# Patient Record
Sex: FEMALE | Race: BLACK OR AFRICAN AMERICAN | NOT HISPANIC OR LATINO | Employment: FULL TIME | RURAL
[De-identification: names, ages, dates, MRNs, and addresses within clinical notes are randomized per-mention and may not be internally consistent; named-entity substitution may affect disease eponyms.]

---

## 2018-10-31 ENCOUNTER — HISTORICAL (OUTPATIENT)
Dept: ADMINISTRATIVE | Facility: HOSPITAL | Age: 48
End: 2018-10-31

## 2018-10-31 LAB — CRC RECOMMENDATION EXT: NORMAL

## 2018-11-01 LAB
LAB AP CLINICAL INFORMATION: NORMAL
LAB AP DIAGNOSIS - HISTORICAL: NORMAL
LAB AP GROSS PATHOLOGY - HISTORICAL: NORMAL
LAB AP SPECIMEN SUBMITTED - HISTORICAL: NORMAL

## 2020-08-04 ENCOUNTER — HISTORICAL (OUTPATIENT)
Dept: ADMINISTRATIVE | Facility: HOSPITAL | Age: 50
End: 2020-08-04

## 2020-08-04 LAB — SARS-COV+SARS-COV-2 AG RESP QL IA.RAPID: NEGATIVE

## 2020-08-20 ENCOUNTER — HISTORICAL (OUTPATIENT)
Dept: ADMINISTRATIVE | Facility: HOSPITAL | Age: 50
End: 2020-08-20

## 2020-08-20 LAB — SARS-COV+SARS-COV-2 AG RESP QL IA.RAPID: NEGATIVE

## 2020-08-28 ENCOUNTER — HISTORICAL (OUTPATIENT)
Dept: ADMINISTRATIVE | Facility: HOSPITAL | Age: 50
End: 2020-08-28

## 2020-08-28 LAB — SARS-COV+SARS-COV-2 AG RESP QL IA.RAPID: NEGATIVE

## 2020-09-09 ENCOUNTER — HISTORICAL (OUTPATIENT)
Dept: ADMINISTRATIVE | Facility: HOSPITAL | Age: 50
End: 2020-09-09

## 2020-09-09 LAB — SARS-COV+SARS-COV-2 AG RESP QL IA.RAPID: NEGATIVE

## 2020-09-29 ENCOUNTER — HISTORICAL (OUTPATIENT)
Dept: ADMINISTRATIVE | Facility: HOSPITAL | Age: 50
End: 2020-09-29

## 2020-10-02 LAB
LAB AP CLINICAL INFORMATION: NORMAL
LAB AP GENERAL CAT - HISTORICAL: NORMAL
LAB AP INTERPRETATION/RESULT - HISTORICAL: NEGATIVE
LAB AP SPECIMEN ADEQUACY - HISTORICAL: NORMAL
LAB AP SPECIMEN SUBMITTED - HISTORICAL: NORMAL

## 2021-02-12 ENCOUNTER — HISTORICAL (OUTPATIENT)
Dept: ADMINISTRATIVE | Facility: HOSPITAL | Age: 51
End: 2021-02-12

## 2021-02-12 LAB — SARS-COV+SARS-COV-2 AG RESP QL IA.RAPID: NEGATIVE

## 2021-02-16 ENCOUNTER — HISTORICAL (OUTPATIENT)
Dept: ADMINISTRATIVE | Facility: HOSPITAL | Age: 51
End: 2021-02-16

## 2021-02-17 LAB — SARS-COV+SARS-COV-2 AG RESP QL IA.RAPID: NEGATIVE

## 2021-03-24 ENCOUNTER — OFFICE VISIT (OUTPATIENT)
Dept: FAMILY MEDICINE | Facility: CLINIC | Age: 51
End: 2021-03-24
Payer: COMMERCIAL

## 2021-03-24 VITALS
WEIGHT: 188 LBS | BODY MASS INDEX: 32.1 KG/M2 | HEIGHT: 64 IN | RESPIRATION RATE: 16 BRPM | SYSTOLIC BLOOD PRESSURE: 134 MMHG | OXYGEN SATURATION: 98 % | TEMPERATURE: 99 F | DIASTOLIC BLOOD PRESSURE: 72 MMHG | HEART RATE: 63 BPM

## 2021-03-24 DIAGNOSIS — Z00.00 WELLNESS EXAMINATION: ICD-10-CM

## 2021-03-24 DIAGNOSIS — E55.9 VITAMIN D DEFICIENCY: Primary | ICD-10-CM

## 2021-03-24 DIAGNOSIS — E53.8 B12 DEFICIENCY: ICD-10-CM

## 2021-03-24 PROCEDURE — 99214 PR OFFICE/OUTPT VISIT, EST, LEVL IV, 30-39 MIN: ICD-10-PCS | Mod: ,,, | Performed by: FAMILY MEDICINE

## 2021-03-24 PROCEDURE — 99214 OFFICE O/P EST MOD 30 MIN: CPT | Mod: ,,, | Performed by: FAMILY MEDICINE

## 2021-03-24 RX ORDER — CHOLECALCIFEROL (VITAMIN D3) 1250 MCG
1 TABLET ORAL
Qty: 12 TABLET | Refills: 0 | Status: SHIPPED | OUTPATIENT
Start: 2021-03-24 | End: 2021-06-23 | Stop reason: SDUPTHER

## 2021-03-24 RX ORDER — ERGOCALCIFEROL 1.25 MG/1
50000 CAPSULE ORAL
COMMUNITY
Start: 2021-02-12 | End: 2022-01-06 | Stop reason: SDUPTHER

## 2021-03-24 RX ORDER — LANOLIN ALCOHOL/MO/W.PET/CERES
100 CREAM (GRAM) TOPICAL DAILY
COMMUNITY
End: 2023-02-28 | Stop reason: SDUPTHER

## 2021-03-24 RX ORDER — FERROUS SULFATE 324(65)MG
324 TABLET, DELAYED RELEASE (ENTERIC COATED) ORAL 2 TIMES DAILY WITH MEALS
COMMUNITY
Start: 2021-02-24 | End: 2021-06-23 | Stop reason: SDUPTHER

## 2021-03-24 RX ORDER — APIXABAN 5 MG/1
5 TABLET, FILM COATED ORAL 2 TIMES DAILY
COMMUNITY
Start: 2021-03-22 | End: 2021-06-23 | Stop reason: SDUPTHER

## 2021-03-24 RX ORDER — FOLIC ACID 1 MG/1
1 TABLET ORAL DAILY
COMMUNITY
End: 2021-06-23 | Stop reason: SDUPTHER

## 2021-03-24 RX ORDER — ERGOCALCIFEROL 1.25 MG/1
50000 CAPSULE ORAL
Qty: 4 CAPSULE | Refills: 4 | Status: CANCELLED | OUTPATIENT
Start: 2021-03-24

## 2021-06-23 ENCOUNTER — OFFICE VISIT (OUTPATIENT)
Dept: FAMILY MEDICINE | Facility: CLINIC | Age: 51
End: 2021-06-23
Payer: COMMERCIAL

## 2021-06-23 VITALS
RESPIRATION RATE: 16 BRPM | HEIGHT: 64 IN | DIASTOLIC BLOOD PRESSURE: 74 MMHG | HEART RATE: 65 BPM | BODY MASS INDEX: 32.27 KG/M2 | OXYGEN SATURATION: 100 % | SYSTOLIC BLOOD PRESSURE: 142 MMHG | TEMPERATURE: 98 F | WEIGHT: 189 LBS

## 2021-06-23 DIAGNOSIS — E55.9 VITAMIN D DEFICIENCY: ICD-10-CM

## 2021-06-23 DIAGNOSIS — I82.593 CHRONIC DEEP VEIN THROMBOSIS (DVT) OF OTHER VEIN OF BOTH LOWER EXTREMITIES: Primary | ICD-10-CM

## 2021-06-23 PROCEDURE — 1126F PR PAIN SEVERITY QUANTIFIED, NO PAIN PRESENT: ICD-10-PCS | Mod: ,,, | Performed by: FAMILY MEDICINE

## 2021-06-23 PROCEDURE — 99214 PR OFFICE/OUTPT VISIT, EST, LEVL IV, 30-39 MIN: ICD-10-PCS | Mod: ,,, | Performed by: FAMILY MEDICINE

## 2021-06-23 PROCEDURE — 3008F PR BODY MASS INDEX (BMI) DOCUMENTED: ICD-10-PCS | Mod: ,,, | Performed by: FAMILY MEDICINE

## 2021-06-23 PROCEDURE — 1126F AMNT PAIN NOTED NONE PRSNT: CPT | Mod: ,,, | Performed by: FAMILY MEDICINE

## 2021-06-23 PROCEDURE — 99214 OFFICE O/P EST MOD 30 MIN: CPT | Mod: ,,, | Performed by: FAMILY MEDICINE

## 2021-06-23 PROCEDURE — 3008F BODY MASS INDEX DOCD: CPT | Mod: ,,, | Performed by: FAMILY MEDICINE

## 2021-06-23 RX ORDER — FOLIC ACID 1 MG/1
1 TABLET ORAL DAILY
Qty: 30 TABLET | Refills: 2 | Status: SHIPPED | OUTPATIENT
Start: 2021-06-23 | End: 2022-09-07 | Stop reason: SDUPTHER

## 2021-06-23 RX ORDER — APIXABAN 5 MG/1
5 TABLET, FILM COATED ORAL 2 TIMES DAILY
Qty: 120 TABLET | Refills: 0 | Status: SHIPPED | OUTPATIENT
Start: 2021-06-23 | End: 2022-01-06 | Stop reason: SDUPTHER

## 2021-06-23 RX ORDER — FERROUS SULFATE 324(65)MG
324 TABLET, DELAYED RELEASE (ENTERIC COATED) ORAL 2 TIMES DAILY WITH MEALS
Qty: 60 TABLET | Refills: 1 | Status: SHIPPED | OUTPATIENT
Start: 2021-06-23 | End: 2022-01-06 | Stop reason: SDUPTHER

## 2021-06-23 RX ORDER — CHOLECALCIFEROL (VITAMIN D3) 1250 MCG
1 TABLET ORAL
Qty: 12 TABLET | Refills: 0 | Status: SHIPPED | OUTPATIENT
Start: 2021-06-23 | End: 2021-09-21

## 2021-07-01 ENCOUNTER — PATIENT MESSAGE (OUTPATIENT)
Dept: ADMINISTRATIVE | Facility: OTHER | Age: 51
End: 2021-07-01

## 2021-08-19 ENCOUNTER — OFFICE VISIT (OUTPATIENT)
Dept: FAMILY MEDICINE | Facility: CLINIC | Age: 51
End: 2021-08-19
Payer: COMMERCIAL

## 2021-08-19 VITALS
DIASTOLIC BLOOD PRESSURE: 73 MMHG | HEIGHT: 64 IN | RESPIRATION RATE: 20 BRPM | WEIGHT: 185 LBS | OXYGEN SATURATION: 99 % | HEART RATE: 74 BPM | SYSTOLIC BLOOD PRESSURE: 143 MMHG | BODY MASS INDEX: 31.58 KG/M2

## 2021-08-19 DIAGNOSIS — Z23 ENCOUNTER FOR IMMUNIZATION: ICD-10-CM

## 2021-08-19 DIAGNOSIS — Z00.00 ROUTINE GENERAL MEDICAL EXAMINATION AT A HEALTH CARE FACILITY: Primary | ICD-10-CM

## 2021-08-19 DIAGNOSIS — Z11.59 ENCOUNTER FOR HEPATITIS C SCREENING TEST FOR LOW RISK PATIENT: ICD-10-CM

## 2021-08-19 DIAGNOSIS — Z11.4 ENCOUNTER FOR SCREENING FOR HUMAN IMMUNODEFICIENCY VIRUS (HIV): ICD-10-CM

## 2021-08-19 LAB
HCV AB SER QL: NORMAL
HIV 1+O+2 AB SERPL QL: NORMAL

## 2021-08-19 PROCEDURE — 3077F SYST BP >= 140 MM HG: CPT | Mod: ICN,,, | Performed by: INTERNAL MEDICINE

## 2021-08-19 PROCEDURE — 1160F RVW MEDS BY RX/DR IN RCRD: CPT | Mod: ICN,,, | Performed by: INTERNAL MEDICINE

## 2021-08-19 PROCEDURE — 90471 IMMUNIZATION ADMIN: CPT | Mod: ICN,,, | Performed by: INTERNAL MEDICINE

## 2021-08-19 PROCEDURE — 99386 PREV VISIT NEW AGE 40-64: CPT | Mod: 25,ICN,, | Performed by: INTERNAL MEDICINE

## 2021-08-19 PROCEDURE — 3008F BODY MASS INDEX DOCD: CPT | Mod: ICN,,, | Performed by: INTERNAL MEDICINE

## 2021-08-19 PROCEDURE — 90750 ZOSTER RECOMBINANT VACCINE: ICD-10-PCS | Mod: ICN,,, | Performed by: INTERNAL MEDICINE

## 2021-08-19 PROCEDURE — 1160F PR REVIEW ALL MEDS BY PRESCRIBER/CLIN PHARMACIST DOCUMENTED: ICD-10-PCS | Mod: ICN,,, | Performed by: INTERNAL MEDICINE

## 2021-08-19 PROCEDURE — 90471 ZOSTER RECOMBINANT VACCINE: ICD-10-PCS | Mod: ICN,,, | Performed by: INTERNAL MEDICINE

## 2021-08-19 PROCEDURE — 90750 HZV VACC RECOMBINANT IM: CPT | Mod: ICN,,, | Performed by: INTERNAL MEDICINE

## 2021-08-19 PROCEDURE — 86803 HEPATITIS C AB TEST: CPT | Mod: ,,, | Performed by: CLINICAL MEDICAL LABORATORY

## 2021-08-19 PROCEDURE — 86803 HEPATITIS C ANTIBODY: ICD-10-PCS | Mod: ,,, | Performed by: CLINICAL MEDICAL LABORATORY

## 2021-08-19 PROCEDURE — 3008F PR BODY MASS INDEX (BMI) DOCUMENTED: ICD-10-PCS | Mod: ICN,,, | Performed by: INTERNAL MEDICINE

## 2021-08-19 PROCEDURE — 3078F DIAST BP <80 MM HG: CPT | Mod: ICN,,, | Performed by: INTERNAL MEDICINE

## 2021-08-19 PROCEDURE — 1159F PR MEDICATION LIST DOCUMENTED IN MEDICAL RECORD: ICD-10-PCS | Mod: ICN,,, | Performed by: INTERNAL MEDICINE

## 2021-08-19 PROCEDURE — 1159F MED LIST DOCD IN RCRD: CPT | Mod: ICN,,, | Performed by: INTERNAL MEDICINE

## 2021-08-19 PROCEDURE — 3078F PR MOST RECENT DIASTOLIC BLOOD PRESSURE < 80 MM HG: ICD-10-PCS | Mod: ICN,,, | Performed by: INTERNAL MEDICINE

## 2021-08-19 PROCEDURE — 87389 HIV 1 / 2 ANTIBODY: ICD-10-PCS | Mod: ,,, | Performed by: CLINICAL MEDICAL LABORATORY

## 2021-08-19 PROCEDURE — 99386 PR PREVENTIVE VISIT,NEW,40-64: ICD-10-PCS | Mod: 25,ICN,, | Performed by: INTERNAL MEDICINE

## 2021-08-19 PROCEDURE — 87389 HIV-1 AG W/HIV-1&-2 AB AG IA: CPT | Mod: ,,, | Performed by: CLINICAL MEDICAL LABORATORY

## 2021-08-19 PROCEDURE — 3077F PR MOST RECENT SYSTOLIC BLOOD PRESSURE >= 140 MM HG: ICD-10-PCS | Mod: ICN,,, | Performed by: INTERNAL MEDICINE

## 2021-09-01 DIAGNOSIS — Z13.9 ENCOUNTER FOR SCREENING: Primary | ICD-10-CM

## 2022-01-06 ENCOUNTER — OFFICE VISIT (OUTPATIENT)
Dept: FAMILY MEDICINE | Facility: CLINIC | Age: 52
End: 2022-01-06
Payer: COMMERCIAL

## 2022-01-06 VITALS
HEART RATE: 92 BPM | OXYGEN SATURATION: 99 % | SYSTOLIC BLOOD PRESSURE: 150 MMHG | WEIGHT: 178 LBS | HEIGHT: 64 IN | RESPIRATION RATE: 16 BRPM | BODY MASS INDEX: 30.39 KG/M2 | DIASTOLIC BLOOD PRESSURE: 89 MMHG

## 2022-01-06 DIAGNOSIS — E55.9 VITAMIN D DEFICIENCY: ICD-10-CM

## 2022-01-06 DIAGNOSIS — I82.593 CHRONIC DEEP VEIN THROMBOSIS (DVT) OF OTHER VEIN OF BOTH LOWER EXTREMITIES: ICD-10-CM

## 2022-01-06 DIAGNOSIS — M19.90 OSTEOARTHRITIS, UNSPECIFIED OSTEOARTHRITIS TYPE, UNSPECIFIED SITE: Primary | ICD-10-CM

## 2022-01-06 LAB — 25(OH)D3 SERPL-MCNC: 54.4 NG/ML

## 2022-01-06 PROCEDURE — 3008F BODY MASS INDEX DOCD: CPT | Mod: ,,, | Performed by: INTERNAL MEDICINE

## 2022-01-06 PROCEDURE — 1160F RVW MEDS BY RX/DR IN RCRD: CPT | Mod: ,,, | Performed by: INTERNAL MEDICINE

## 2022-01-06 PROCEDURE — 1159F PR MEDICATION LIST DOCUMENTED IN MEDICAL RECORD: ICD-10-PCS | Mod: ,,, | Performed by: INTERNAL MEDICINE

## 2022-01-06 PROCEDURE — 99214 OFFICE O/P EST MOD 30 MIN: CPT | Mod: ,,, | Performed by: INTERNAL MEDICINE

## 2022-01-06 PROCEDURE — 82306 VITAMIN D: ICD-10-PCS | Mod: ,,, | Performed by: CLINICAL MEDICAL LABORATORY

## 2022-01-06 PROCEDURE — 82306 VITAMIN D 25 HYDROXY: CPT | Mod: ,,, | Performed by: CLINICAL MEDICAL LABORATORY

## 2022-01-06 PROCEDURE — 99214 PR OFFICE/OUTPT VISIT, EST, LEVL IV, 30-39 MIN: ICD-10-PCS | Mod: ,,, | Performed by: INTERNAL MEDICINE

## 2022-01-06 PROCEDURE — 3008F PR BODY MASS INDEX (BMI) DOCUMENTED: ICD-10-PCS | Mod: ,,, | Performed by: INTERNAL MEDICINE

## 2022-01-06 PROCEDURE — 3079F PR MOST RECENT DIASTOLIC BLOOD PRESSURE 80-89 MM HG: ICD-10-PCS | Mod: ,,, | Performed by: INTERNAL MEDICINE

## 2022-01-06 PROCEDURE — 1160F PR REVIEW ALL MEDS BY PRESCRIBER/CLIN PHARMACIST DOCUMENTED: ICD-10-PCS | Mod: ,,, | Performed by: INTERNAL MEDICINE

## 2022-01-06 PROCEDURE — 3077F SYST BP >= 140 MM HG: CPT | Mod: ,,, | Performed by: INTERNAL MEDICINE

## 2022-01-06 PROCEDURE — 3079F DIAST BP 80-89 MM HG: CPT | Mod: ,,, | Performed by: INTERNAL MEDICINE

## 2022-01-06 PROCEDURE — 3077F PR MOST RECENT SYSTOLIC BLOOD PRESSURE >= 140 MM HG: ICD-10-PCS | Mod: ,,, | Performed by: INTERNAL MEDICINE

## 2022-01-06 PROCEDURE — 1159F MED LIST DOCD IN RCRD: CPT | Mod: ,,, | Performed by: INTERNAL MEDICINE

## 2022-01-06 RX ORDER — ERGOCALCIFEROL 1.25 MG/1
50000 CAPSULE ORAL
Qty: 12 CAPSULE | Refills: 0 | Status: SHIPPED | OUTPATIENT
Start: 2022-01-06 | End: 2024-01-03

## 2022-01-06 RX ORDER — APIXABAN 5 MG/1
5 TABLET, FILM COATED ORAL 2 TIMES DAILY
Qty: 120 TABLET | Refills: 0 | Status: SHIPPED | OUTPATIENT
Start: 2022-01-06 | End: 2022-02-23

## 2022-01-06 RX ORDER — FERROUS SULFATE 324(65)MG
324 TABLET, DELAYED RELEASE (ENTERIC COATED) ORAL 2 TIMES DAILY WITH MEALS
Qty: 60 TABLET | Refills: 1 | Status: SHIPPED | OUTPATIENT
Start: 2022-01-06 | End: 2022-09-07 | Stop reason: SDUPTHER

## 2022-01-06 NOTE — LETTER
January 6, 2022      [facility]  [address] [facility] [place] [address]  Phone: [phone]  Fax: [phone]       Patient: Karli Singh   YOB: 1970  Date of Visit: 01/06/2022    To Whom It May Concern:    KINZA Singh  was at [facility] on 01/06/2022. The patient may return to work on 01/10/2022 with no restrictions. If you have any questions or concerns, or if I can be of further assistance, please do not hesitate to contact me.    Sincerely,  MD Mary Jo Slater RN

## 2022-01-18 ENCOUNTER — TELEPHONE (OUTPATIENT)
Dept: FAMILY MEDICINE | Facility: CLINIC | Age: 52
End: 2022-01-18
Payer: COMMERCIAL

## 2022-01-18 NOTE — TELEPHONE ENCOUNTER
Call returned to pt to find out what meds were not sent to pharmacy. We have on file where 3 prescriptions were sent to her pharmacy electronically on 1/6/22.    No answer. Left message

## 2022-01-18 NOTE — TELEPHONE ENCOUNTER
----- Message from Lelo Wheat sent at 1/17/2022 12:26 PM CST -----  Meds have not been called into [facility] kasandra [id number]. Was seen last week

## 2022-01-26 NOTE — PROGRESS NOTES
Patient seen evaluated on 01/06/2022 patient is awake alert patient is in no acute distress patient does complain of swelling in both lower extremities.  Patient also complains of diffuse arthritis.  Vital signs reviewed and are stable  Lungs are clear no crackles or wheezing cardiovascular S1-S2 regular rate rhythm abdomen is soft positive bowel sounds obese nontender extremities not edematous neuro is no focal neurological deficits    Vitamin-D level is low    Problem 1. 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 b.i.d. replace the vitamin-D, and and continue anticoagulation.

## 2022-02-23 ENCOUNTER — HOSPITAL ENCOUNTER (OUTPATIENT)
Dept: RADIOLOGY | Facility: HOSPITAL | Age: 52
Discharge: HOME | End: 2022-02-23
Payer: COMMERCIAL

## 2022-02-23 ENCOUNTER — OFFICE VISIT (OUTPATIENT)
Dept: FAMILY MEDICINE | Facility: CLINIC | Age: 52
End: 2022-02-23
Payer: COMMERCIAL

## 2022-02-23 VITALS
OXYGEN SATURATION: 96 % | TEMPERATURE: 98 F | HEIGHT: 64 IN | SYSTOLIC BLOOD PRESSURE: 147 MMHG | HEART RATE: 82 BPM | DIASTOLIC BLOOD PRESSURE: 84 MMHG | RESPIRATION RATE: 18 BRPM | BODY MASS INDEX: 30.39 KG/M2 | WEIGHT: 178 LBS

## 2022-02-23 DIAGNOSIS — Z13.220 SCREENING FOR LIPID DISORDERS: ICD-10-CM

## 2022-02-23 DIAGNOSIS — Z00.01 ENCOUNTER FOR ANNUAL GENERAL MEDICAL EXAMINATION WITH ABNORMAL FINDINGS IN ADULT: Primary | ICD-10-CM

## 2022-02-23 DIAGNOSIS — Z79.899 ENCOUNTER FOR LONG-TERM (CURRENT) DRUG USE: ICD-10-CM

## 2022-02-23 DIAGNOSIS — Z13.1 SCREENING FOR DIABETES MELLITUS: ICD-10-CM

## 2022-02-23 DIAGNOSIS — I82.502 CHRONIC DEEP VEIN THROMBOSIS (DVT) OF LEFT LOWER EXTREMITY, UNSPECIFIED VEIN: ICD-10-CM

## 2022-02-23 DIAGNOSIS — Z12.39 SCREENING BREAST EXAMINATION: ICD-10-CM

## 2022-02-23 PROBLEM — Z00.00 ENCOUNTER FOR GENERAL ADULT MEDICAL EXAMINATION W/O ABNORMAL FINDINGS: Status: ACTIVE | Noted: 2022-02-23

## 2022-02-23 LAB
CHOLEST SERPL-MCNC: 184 MG/DL (ref 0–200)
CHOLEST/HDLC SERPL: 2.5 {RATIO}
CTP QC/QA: YES
EST. AVERAGE GLUCOSE BLD GHB EST-MCNC: 90 MG/DL
GLUCOSE SERPL-MCNC: 87 MG/DL (ref 74–106)
HBA1C MFR BLD HPLC: 5.3 % (ref 4.5–6.6)
HDLC SERPL-MCNC: 75 MG/DL (ref 40–60)
INR POC: 1 (ref 0–3.3)
LDLC SERPL CALC-MCNC: 99 MG/DL
LDLC/HDLC SERPL: 1.3 {RATIO}
NONHDLC SERPL-MCNC: 109 MG/DL
PT, POC: 12.4 (ref 12–14.7)
TRIGL SERPL-MCNC: 50 MG/DL (ref 35–150)
VLDLC SERPL-MCNC: 10 MG/DL

## 2022-02-23 PROCEDURE — 1160F RVW MEDS BY RX/DR IN RCRD: CPT | Mod: ,,, | Performed by: NURSE PRACTITIONER

## 2022-02-23 PROCEDURE — 77067 SCR MAMMO BI INCL CAD: CPT | Mod: TC

## 2022-02-23 PROCEDURE — 3077F SYST BP >= 140 MM HG: CPT | Mod: ,,, | Performed by: NURSE PRACTITIONER

## 2022-02-23 PROCEDURE — 80061 LIPID PANEL: CPT | Mod: ,,, | Performed by: CLINICAL MEDICAL LABORATORY

## 2022-02-23 PROCEDURE — 82947 ASSAY GLUCOSE BLOOD QUANT: CPT | Mod: ,,, | Performed by: CLINICAL MEDICAL LABORATORY

## 2022-02-23 PROCEDURE — 3008F PR BODY MASS INDEX (BMI) DOCUMENTED: ICD-10-PCS | Mod: ,,, | Performed by: NURSE PRACTITIONER

## 2022-02-23 PROCEDURE — 83036 HEMOGLOBIN GLYCOSYLATED A1C: CPT | Mod: ,,, | Performed by: CLINICAL MEDICAL LABORATORY

## 2022-02-23 PROCEDURE — 3079F DIAST BP 80-89 MM HG: CPT | Mod: ,,, | Performed by: NURSE PRACTITIONER

## 2022-02-23 PROCEDURE — 3077F PR MOST RECENT SYSTOLIC BLOOD PRESSURE >= 140 MM HG: ICD-10-PCS | Mod: ,,, | Performed by: NURSE PRACTITIONER

## 2022-02-23 PROCEDURE — 3079F PR MOST RECENT DIASTOLIC BLOOD PRESSURE 80-89 MM HG: ICD-10-PCS | Mod: ,,, | Performed by: NURSE PRACTITIONER

## 2022-02-23 PROCEDURE — 80061 LIPID PANEL: ICD-10-PCS | Mod: ,,, | Performed by: CLINICAL MEDICAL LABORATORY

## 2022-02-23 PROCEDURE — 83036 HEMOGLOBIN A1C: ICD-10-PCS | Mod: ,,, | Performed by: CLINICAL MEDICAL LABORATORY

## 2022-02-23 PROCEDURE — 3008F BODY MASS INDEX DOCD: CPT | Mod: ,,, | Performed by: NURSE PRACTITIONER

## 2022-02-23 PROCEDURE — 1159F PR MEDICATION LIST DOCUMENTED IN MEDICAL RECORD: ICD-10-PCS | Mod: ,,, | Performed by: NURSE PRACTITIONER

## 2022-02-23 PROCEDURE — 99396 PREV VISIT EST AGE 40-64: CPT | Mod: ,,, | Performed by: NURSE PRACTITIONER

## 2022-02-23 PROCEDURE — 85610 POCT PT/INR: ICD-10-PCS | Mod: ,,, | Performed by: NURSE PRACTITIONER

## 2022-02-23 PROCEDURE — 82947 GLUCOSE, FASTING: ICD-10-PCS | Mod: ,,, | Performed by: CLINICAL MEDICAL LABORATORY

## 2022-02-23 PROCEDURE — 1159F MED LIST DOCD IN RCRD: CPT | Mod: ,,, | Performed by: NURSE PRACTITIONER

## 2022-02-23 PROCEDURE — 1160F PR REVIEW ALL MEDS BY PRESCRIBER/CLIN PHARMACIST DOCUMENTED: ICD-10-PCS | Mod: ,,, | Performed by: NURSE PRACTITIONER

## 2022-02-23 PROCEDURE — 85610 PROTHROMBIN TIME: CPT | Mod: ,,, | Performed by: NURSE PRACTITIONER

## 2022-02-23 PROCEDURE — 99396 PR PREVENTIVE VISIT,EST,40-64: ICD-10-PCS | Mod: ,,, | Performed by: NURSE PRACTITIONER

## 2022-02-23 RX ORDER — WARFARIN SODIUM 5 MG/1
5 TABLET ORAL DAILY
Qty: 30 TABLET | Refills: 3 | Status: SHIPPED | OUTPATIENT
Start: 2022-02-23 | End: 2022-09-07 | Stop reason: SDUPTHER

## 2022-02-23 NOTE — PROGRESS NOTES
[facility]  Chief Complaint      Chief Complaint   Patient presents with    Annual Exam     Healthy you       History of Present Illness      Karli Singh is a 51 y.o. female with chronic conditions of Deep Venous Thrombus of left lower extremity, Hx of Protein S Deficiency Disease, and Anemia. She presents today for a [facility] Wellness Exam. She states she has been doing well and offers no complaints, however, the pt request change in her anticoagulant medication due to Eliquis rx no longer covered by her insurance. The pt is currently taking Eliquis 5 mg po BID for management of chronic dvt to left lower extremity.  She admits to taking Xarelto and Coumadin rxs in the past, states she does not want to take Xarelto due to it causing cramping pain and spasms to her hands.     Past Medical History:  Past Medical History:   Diagnosis Date    Anemia        Past Surgical History:   has no past surgical history on file.    Social History:  Social History     Tobacco Use    Smoking status: Never Smoker    Smokeless tobacco: Never Used   Substance Use Topics    Alcohol use: Never    Drug use: Never          Review of Systems   Constitutional: Negative for appetite change, fatigue, fever and unexpected weight change.   Respiratory: Negative for cough, shortness of breath and wheezing.    Cardiovascular: Negative for chest pain and leg swelling.        Hx of Chronic DVT to left lower extremity   Gastrointestinal: Negative for abdominal pain, constipation, diarrhea, nausea and vomiting.   Genitourinary: Negative for decreased urine volume, difficulty urinating, dysuria and flank pain.   Musculoskeletal: Negative for arthralgias and myalgias.   Skin: Negative for color change and rash.   Neurological: Negative for dizziness, syncope, weakness and headaches.        Medications:  Outpatient Encounter Medications as of 2/23/2022   Medication Sig Dispense Refill    cyanocobalamin (VITAMIN  "B-12) 1000 MCG tablet Take 100 mcg by mouth once daily.      ergocalciferol (ERGOCALCIFEROL) 50,000 unit Cap Take 1 capsule (50,000 Units total) by mouth every 7 days. 12 capsule 0    ferrous sulfate 324 mg (65 mg iron) TbEC Take 1 tablet (324 mg total) by mouth 2 (two) times daily with meals. 60 tablet 1    folic acid (FOLVITE) 1 MG tablet Take 1 tablet (1 mg total) by mouth once daily. 30 tablet 2    [DISCONTINUED] ELIQUIS 5 mg Tab Take 1 tablet (5 mg total) by mouth 2 (two) times daily. 120 tablet 0    warfarin (COUMADIN) 5 MG tablet Take 1 tablet (5 mg total) by mouth Daily. At hour of sleep 30 tablet 3     No facility-administered encounter medications on file as of 2/23/2022.       Allergies:  Review of patient's allergies indicates:   Allergen Reactions    Naprosyn [naproxen]        Health Maintenance:  Immunization History   Administered Date(s) Administered    Zoster Recombinant 08/19/2021      Health Maintenance   Topic Date Due    TETANUS VACCINE  08/19/2022 (Originally 10/3/1986)    Mammogram  02/23/2023    Lipid Panel  03/25/2026    Hepatitis C Screening  Completed        Physical Exam      Vital Signs  Temp: 97.6 °F (36.4 °C)  Temp src: Oral  Pulse: 82  Resp: 18  SpO2: 96 %  BP: (!) 147/84  BP Location: Right arm  Patient Position: Sitting  Height and Weight  Height: 5' 4" (162.6 cm)  Weight: 80.7 kg (178 lb)  BSA (Calculated - sq m): 1.91 sq meters  BMI (Calculated): 30.5  Weight in (lb) to have BMI = 25: 145.3]    Physical Exam  Constitutional:       General: She is not in acute distress.     Appearance: Normal appearance. She is obese.   HENT:      Head: Normocephalic.      Mouth/Throat:      Mouth: Mucous membranes are moist.   Cardiovascular:      Rate and Rhythm: Normal rate and regular rhythm.      Pulses: Normal pulses.      Heart sounds: Normal heart sounds. No murmur heard.  Musculoskeletal:         General: Normal range of motion.      Cervical back: Neck supple.   Skin:     " General: Skin is warm and dry.   Neurological:      Mental Status: She is alert and oriented to person, place, and time.   Psychiatric:         Mood and Affect: Mood normal.         Behavior: Behavior normal.          Laboratory:  CBC:  Recent Labs   Lab 03/25/21 0824   WBC 6.47   RBC 5.44 H   Hemoglobin 9.7 L   Hematocrit 33.2 L   Platelet Count 217   MCV 61.0 L   MCH 17.8 L   MCHC 29.2 L     CMP:  Recent Labs   Lab 03/25/21 0824   Glucose 80   Calcium 8.5   Albumin 3.5   Total Protein 7.7   Sodium 144   Potassium 4.2   CO2 32   Chloride 109 H   BUN 7   Alk Phos 105   ALT 14   AST 16   Bilirubin, Total 0.4     LIPIDS:  Recent Labs   Lab 03/25/21 0824   TSH 2.530   HDL Cholesterol 69   Cholesterol 181   Triglycerides 110   LDL Calculated 90   Non-     TSH:  Recent Labs   Lab 03/25/21 0824   TSH 2.530     A1C:        Assessment/Plan     Karli Singh is a 51 y.o.female with:     1. Screening for lipid disorders  - Lipid Panel; Future  - Lipid Panel    2. Screening for diabetes mellitus  - Glucose, Fasting; Future  - Hemoglobin A1C; Future  - Glucose, Fasting  - Hemoglobin A1C    3. Encounter for long-term (current) drug use  - POCT PT/INR    4. Chronic deep vein thrombosis (DVT) of left lower extremity, unspecified vein  - warfarin (COUMADIN) 5 MG tablet; Take 1 tablet (5 mg total) by mouth Daily. At hour of sleep  Dispense: 30 tablet; Refill: 3    5. Encounter for annual general medical examination with abnormal findings in adult     PT/INR today 1.0. Collaborated with Dr. Bar and will discontinue Eliquis rx. Start Coumadin 5 mg po at HS. Pt to return to clinic in 1 week for PT/INR and  follow-up visit with Dr. Bar.      Health goals to improve overall health and well-being:  Lower blood pressure by reducing calorie intake, engaging in physical activity, and eating heart healthy low sodium meals.   Biometrics: Monitor blood pressure at home with goal of systolic pressure < 130 and diastolic  pressure < 80.    Lifestyle: Get mammogram and pap smear done this year.  Keep all follow-up medical appointments. Take all medications as prescribed.    Nutrition: Eat a heart healthy high fiber diet including increased vegetables, fruit, lean meats and whole grains. Limit salt, sugar, and processed foods from diet.    Exercise: Engage in physical activity 30 minutes 3-5 days a week.     Tobacco: Avoid all tobacco products including second and  smoke exposure.      Return to clinic for follow-up on chronic medical conditions with Dr. Bar. Return to clinic in 1 year for annual wellness exam.    MONET [facility]

## 2022-05-30 PROBLEM — Z00.01 ENCOUNTER FOR ANNUAL GENERAL MEDICAL EXAMINATION WITH ABNORMAL FINDINGS IN ADULT: Status: RESOLVED | Noted: 2022-02-23 | Resolved: 2022-05-30

## 2022-07-22 ENCOUNTER — PATIENT OUTREACH (OUTPATIENT)
Dept: FAMILY MEDICINE | Facility: CLINIC | Age: 52
End: 2022-07-22
Payer: COMMERCIAL

## 2022-09-07 ENCOUNTER — OFFICE VISIT (OUTPATIENT)
Dept: FAMILY MEDICINE | Facility: CLINIC | Age: 52
End: 2022-09-07
Payer: COMMERCIAL

## 2022-09-07 VITALS
WEIGHT: 175.38 LBS | OXYGEN SATURATION: 95 % | DIASTOLIC BLOOD PRESSURE: 80 MMHG | RESPIRATION RATE: 18 BRPM | HEART RATE: 83 BPM | SYSTOLIC BLOOD PRESSURE: 144 MMHG | HEIGHT: 64 IN | TEMPERATURE: 98 F | BODY MASS INDEX: 29.94 KG/M2

## 2022-09-07 DIAGNOSIS — D64.9 ANEMIA, UNSPECIFIED TYPE: ICD-10-CM

## 2022-09-07 DIAGNOSIS — I82.502 CHRONIC DEEP VEIN THROMBOSIS (DVT) OF LEFT LOWER EXTREMITY, UNSPECIFIED VEIN: ICD-10-CM

## 2022-09-07 DIAGNOSIS — D68.59 PROTEIN S DEFICIENCY: ICD-10-CM

## 2022-09-07 DIAGNOSIS — D56.9 THALASSEMIA, UNSPECIFIED TYPE: ICD-10-CM

## 2022-09-07 DIAGNOSIS — Z20.822 CONTACT WITH AND (SUSPECTED) EXPOSURE TO COVID-19: Primary | ICD-10-CM

## 2022-09-07 DIAGNOSIS — I82.593 CHRONIC DEEP VEIN THROMBOSIS (DVT) OF OTHER VEIN OF BOTH LOWER EXTREMITIES: ICD-10-CM

## 2022-09-07 DIAGNOSIS — E55.9 VITAMIN D DEFICIENCY: ICD-10-CM

## 2022-09-07 LAB
ANION GAP SERPL CALCULATED.3IONS-SCNC: 11 MMOL/L (ref 7–16)
ANISOCYTOSIS BLD QL SMEAR: ABNORMAL
BASO STIPL BLD QL SMEAR: ABNORMAL
BASOPHILS # BLD AUTO: 0.04 K/UL (ref 0–0.2)
BASOPHILS NFR BLD AUTO: 0.7 % (ref 0–1)
BUN SERPL-MCNC: 8 MG/DL (ref 7–18)
BUN/CREAT SERPL: 9 (ref 6–20)
CALCIUM SERPL-MCNC: 9 MG/DL (ref 8.5–10.1)
CHLORIDE SERPL-SCNC: 103 MMOL/L (ref 98–107)
CO2 SERPL-SCNC: 28 MMOL/L (ref 21–32)
CREAT SERPL-MCNC: 0.92 MG/DL (ref 0.55–1.02)
CTP QC/QA: YES
DIFFERENTIAL METHOD BLD: ABNORMAL
EGFR (NO RACE VARIABLE) (RUSH/TITUS): 75 ML/MIN/1.73M²
EOSINOPHIL # BLD AUTO: 0.02 K/UL (ref 0–0.5)
EOSINOPHIL NFR BLD AUTO: 0.4 % (ref 1–4)
ERYTHROCYTE [DISTWIDTH] IN BLOOD BY AUTOMATED COUNT: 18.7 % (ref 11.5–14.5)
FERRITIN SERPL-MCNC: 681 NG/ML (ref 8–252)
FLUAV AG NPH QL: NEGATIVE
FLUBV AG NPH QL: NEGATIVE
FOLATE SERPL-MCNC: 12.8 NG/ML (ref 3.1–17.5)
GLUCOSE SERPL-MCNC: 81 MG/DL (ref 74–106)
HCT VFR BLD AUTO: 34.1 % (ref 38–47)
HGB BLD-MCNC: 10.3 G/DL (ref 12–16)
HYPOCHROMIA BLD QL SMEAR: ABNORMAL
IMM GRANULOCYTES # BLD AUTO: 0.02 K/UL (ref 0–0.04)
IMM GRANULOCYTES NFR BLD: 0.4 % (ref 0–0.4)
IMM RETICS NFR: 23.1 % (ref 3–15.9)
INR POC: 1.4 (ref 0–3.3)
IRON SATN MFR SERPL: 27 % (ref 14–50)
IRON SERPL-MCNC: 51 ΜG/DL (ref 50–170)
LYMPHOCYTES # BLD AUTO: 1.85 K/UL (ref 1–4.8)
LYMPHOCYTES NFR BLD AUTO: 34.2 % (ref 27–41)
MCH RBC QN AUTO: 18.4 PG (ref 27–31)
MCHC RBC AUTO-ENTMCNC: 30.2 G/DL (ref 32–36)
MCV RBC AUTO: 61 FL (ref 80–96)
MICROCYTES BLD QL SMEAR: ABNORMAL
MONOCYTES # BLD AUTO: 0.52 K/UL (ref 0–0.8)
MONOCYTES NFR BLD AUTO: 9.6 % (ref 2–6)
MPC BLD CALC-MCNC: ABNORMAL G/DL
NEUTROPHILS # BLD AUTO: 2.96 K/UL (ref 1.8–7.7)
NEUTROPHILS NFR BLD AUTO: 54.7 % (ref 53–65)
NRBC # BLD AUTO: 0.03 X10E3/UL
NRBC, AUTO (.00): 0.6 %
OVALOCYTES BLD QL SMEAR: ABNORMAL
PLATELET # BLD AUTO: 270 K/UL (ref 150–400)
PLATELET MORPHOLOGY: ABNORMAL
POLYCHROMASIA BLD QL SMEAR: ABNORMAL
POTASSIUM SERPL-SCNC: 3.6 MMOL/L (ref 3.5–5.1)
PT, POC: 16.5 (ref 12–14.7)
RBC # BLD AUTO: 5.59 M/UL (ref 4.2–5.4)
RETICS # AUTO: 0.11 X10E6/UL (ref 0.02–0.11)
RETICS/RBC NFR AUTO: 2 % (ref 0.4–2.2)
S PYO RRNA THROAT QL PROBE: NEGATIVE
SARS-COV-2 AG RESP QL IA.RAPID: NEGATIVE
SCHISTOCYTES BLD QL AUTO: ABNORMAL
SODIUM SERPL-SCNC: 138 MMOL/L (ref 136–145)
TARGETS BLD QL SMEAR: ABNORMAL
TIBC SERPL-MCNC: 192 ΜG/DL (ref 250–450)
VIT B12 SERPL-MCNC: 304 PG/ML (ref 193–986)
WBC # BLD AUTO: 5.41 K/UL (ref 4.5–11)

## 2022-09-07 PROCEDURE — 85045 RETICULOCYTES: ICD-10-PCS | Mod: ,,, | Performed by: CLINICAL MEDICAL LABORATORY

## 2022-09-07 PROCEDURE — 80048 BASIC METABOLIC PNL TOTAL CA: CPT | Mod: ,,, | Performed by: CLINICAL MEDICAL LABORATORY

## 2022-09-07 PROCEDURE — 82746 ASSAY OF FOLIC ACID SERUM: CPT | Mod: ,,, | Performed by: CLINICAL MEDICAL LABORATORY

## 2022-09-07 PROCEDURE — 87880 POCT RAPID STREP A: ICD-10-PCS | Mod: QW,,, | Performed by: INTERNAL MEDICINE

## 2022-09-07 PROCEDURE — 99214 PR OFFICE/OUTPT VISIT, EST, LEVL IV, 30-39 MIN: ICD-10-PCS | Mod: ,,, | Performed by: INTERNAL MEDICINE

## 2022-09-07 PROCEDURE — 85610 POCT PT/INR: ICD-10-PCS | Mod: ,,, | Performed by: INTERNAL MEDICINE

## 2022-09-07 PROCEDURE — 82607 VITAMIN B12: ICD-10-PCS | Mod: ,,, | Performed by: CLINICAL MEDICAL LABORATORY

## 2022-09-07 PROCEDURE — 83540 ASSAY OF IRON: CPT | Mod: ,,, | Performed by: CLINICAL MEDICAL LABORATORY

## 2022-09-07 PROCEDURE — 83550 IRON AND TIBC: ICD-10-PCS | Mod: ,,, | Performed by: CLINICAL MEDICAL LABORATORY

## 2022-09-07 PROCEDURE — 82607 VITAMIN B-12: CPT | Mod: ,,, | Performed by: CLINICAL MEDICAL LABORATORY

## 2022-09-07 PROCEDURE — 82746 FOLATE: ICD-10-PCS | Mod: ,,, | Performed by: CLINICAL MEDICAL LABORATORY

## 2022-09-07 PROCEDURE — 85025 COMPLETE CBC W/AUTO DIFF WBC: CPT | Mod: ,,, | Performed by: CLINICAL MEDICAL LABORATORY

## 2022-09-07 PROCEDURE — 85610 PROTHROMBIN TIME: CPT | Mod: ,,, | Performed by: INTERNAL MEDICINE

## 2022-09-07 PROCEDURE — 83550 IRON BINDING TEST: CPT | Mod: ,,, | Performed by: CLINICAL MEDICAL LABORATORY

## 2022-09-07 PROCEDURE — 82728 FERRITIN: ICD-10-PCS | Mod: ,,, | Performed by: CLINICAL MEDICAL LABORATORY

## 2022-09-07 PROCEDURE — 85025 CBC WITH DIFFERENTIAL: ICD-10-PCS | Mod: ,,, | Performed by: CLINICAL MEDICAL LABORATORY

## 2022-09-07 PROCEDURE — 87428 POCT SARS-COV2 (COVID) WITH FLU ANTIGEN: ICD-10-PCS | Mod: QW,,, | Performed by: INTERNAL MEDICINE

## 2022-09-07 PROCEDURE — 82728 ASSAY OF FERRITIN: CPT | Mod: ,,, | Performed by: CLINICAL MEDICAL LABORATORY

## 2022-09-07 PROCEDURE — 85045 AUTOMATED RETICULOCYTE COUNT: CPT | Mod: ,,, | Performed by: CLINICAL MEDICAL LABORATORY

## 2022-09-07 PROCEDURE — 83540 IRON AND TIBC: ICD-10-PCS | Mod: ,,, | Performed by: CLINICAL MEDICAL LABORATORY

## 2022-09-07 PROCEDURE — 87428 SARSCOV & INF VIR A&B AG IA: CPT | Mod: QW,,, | Performed by: INTERNAL MEDICINE

## 2022-09-07 PROCEDURE — 87880 STREP A ASSAY W/OPTIC: CPT | Mod: QW,,, | Performed by: INTERNAL MEDICINE

## 2022-09-07 PROCEDURE — 80048 BASIC METABOLIC PANEL: ICD-10-PCS | Mod: ,,, | Performed by: CLINICAL MEDICAL LABORATORY

## 2022-09-07 PROCEDURE — 99214 OFFICE O/P EST MOD 30 MIN: CPT | Mod: ,,, | Performed by: INTERNAL MEDICINE

## 2022-09-07 NOTE — PATIENT INSTRUCTIONS
"Karli was seen today for sore throat, sinus problem, ears "scratchy", covid exposure, medication refill and hypertension.    Diagnoses and all orders for this visit:    Contact with and (suspected) exposure to covid-19  -     POCT SARS-COV2 (COVID) with Flu Antigen  -     POCT rapid strep A    Chronic deep vein thrombosis (DVT) of left lower extremity, unspecified vein  -     POCT PT/INR  The following orders have not been finalized:  -     warfarin (COUMADIN) 5 MG tablet    Protein S deficiency  -     POCT PT/INR    Anemia, unspecified type  -     Basic Metabolic Panel; Future  -     CBC Auto Differential; Future  -     Vitamin B12; Future  -     Folate; Future  -     Iron and TIBC; Future  -     Ferritin; Future  -     Reticulocytes; Future  -     Basic Metabolic Panel  -     CBC Auto Differential  -     Vitamin B12  -     Folate  -     Iron and TIBC  -     Ferritin  -     Reticulocytes    Chronic deep vein thrombosis (DVT) of other vein of both lower extremities  The following orders have not been finalized:  -     folic acid (FOLVITE) 1 MG tablet    Vitamin D deficiency  The following orders have not been finalized:  -     ferrous sulfate 324 mg (65 mg iron) TbEC    Thalassemia, unspecified type     "

## 2022-09-07 NOTE — PROGRESS NOTES
"Subjective:       Patient ID: Karli Singh is a 51 y.o. female.    Chief Complaint: Sore Throat, Sinus Problem, ears "scratchy", covid exposure (From family members), Medication Refill, and Hypertension    Patient is here today for check up evaluation. Patient requesting Coumadin refill. States she is currently on Coumadin for history of DVT of left lower leg. Patient also has past medical history of Protein S deficiency. Patient states she has not been getting her Coumadin level checked due to refusing to have blood drawn often. Advised patient she will need to have labs today. Will also order Folic Acid 1 mg PO QD as well as her Coumadin refill. Will follow in 3 months.     Sore Throat     Sinus Problem  Associated symptoms include a sore throat.   Medication Refill  Associated symptoms include a sore throat.   Hypertension    Current Medications:    Current Outpatient Medications:     cyanocobalamin (VITAMIN B-12) 1000 MCG tablet, Take 100 mcg by mouth once daily., Disp: , Rfl:     ergocalciferol (ERGOCALCIFEROL) 50,000 unit Cap, Take 1 capsule (50,000 Units total) by mouth every 7 days., Disp: 12 capsule, Rfl: 0    ferrous sulfate 324 mg (65 mg iron) TbEC, Take 1 tablet (324 mg total) by mouth 2 (two) times daily with meals., Disp: 60 tablet, Rfl: 1    folic acid (FOLVITE) 1 MG tablet, Take 1 tablet (1 mg total) by mouth once daily., Disp: 30 tablet, Rfl: 2    warfarin (COUMADIN) 5 MG tablet, Take 1 tablet (5 mg total) by mouth Daily. At hour of sleep, Disp: 30 tablet, Rfl: 3    Last Labs:     Office Visit on 09/07/2022   Component Date Value    SARS Coronavirus 2 Antig* 09/07/2022 Negative     Rapid Influenza A Ag 09/07/2022 Negative     Rapid Influenza B Ag 09/07/2022 Negative      Acceptab* 09/07/2022 Yes     Rapid Strep A Screen 09/07/2022 Negative      Acceptab* 09/07/2022 Yes     Protime 09/07/2022 16.5 (A)     INR 09/07/2022 1.4      Acceptab* 09/07/2022 " Yes        Last Imaging:  Mammo Digital Screening Bilat  Narrative: Result:   Mammo Digital Screening Bilat     History:  Patient is 51 y.o. and is seen for a screening mammogram.    Films Compared:  Prior images (if available) were compared.     Findings:  The breasts have scattered areas of fibroglandular density. There is no   evidence of suspicious masses, calcifications, or other abnormal findings.  Impression: Bilateral  There is no mammographic evidence of malignancy.    BI-RADS Category:   Overall: 1 - Negative     Recommendation:  Routine screening mammogram in 1 year is recommended.    Your estimated lifetime risk of breast cancer (to age 85) based on   Tyrer-Cuzick risk assessment model is Tyrer-Cuzick: 7.06 %. According to   the American Cancer Society, patients with a lifetime breast cancer risk   of 20% or higher might benefit from supplemental screening tests.         Review of Systems   HENT:  Positive for sore throat.    All other systems reviewed and are negative.      Objective:      Physical Exam  Vitals reviewed.   Constitutional:       Appearance: Normal appearance.   Cardiovascular:      Rate and Rhythm: Normal rate and regular rhythm.      Pulses: Normal pulses.      Heart sounds: Normal heart sounds.   Pulmonary:      Effort: Pulmonary effort is normal.      Breath sounds: Normal breath sounds.   Abdominal:      General: Abdomen is flat. Bowel sounds are normal.      Palpations: Abdomen is soft.   Musculoskeletal:         General: Normal range of motion.      Cervical back: Normal range of motion and neck supple.   Skin:     General: Skin is warm and dry.   Neurological:      General: No focal deficit present.      Mental Status: She is alert and oriented to person, place, and time. Mental status is at baseline.       Assessment:       1. Contact with and (suspected) exposure to covid-19  POCT SARS-COV2 (COVID) with Flu Antigen    POCT rapid strep A      2. Chronic deep vein thrombosis (DVT)  "of left lower extremity, unspecified vein  POCT PT/INR      3. Protein S deficiency  POCT PT/INR      4. Anemia, unspecified type  Basic Metabolic Panel    CBC Auto Differential    Vitamin B12    Folate    Iron and TIBC    Ferritin    Reticulocytes    Basic Metabolic Panel    CBC Auto Differential    Vitamin B12    Folate    Iron and TIBC    Ferritin    Reticulocytes      5. Chronic deep vein thrombosis (DVT) of other vein of both lower extremities        6. Vitamin D deficiency        7. Thalassemia, unspecified type             Plan:         Karli was seen today for sore throat, sinus problem, ears "scratchy", covid exposure, medication refill and hypertension.    Diagnoses and all orders for this visit:    Contact with and (suspected) exposure to covid-19  -     POCT SARS-COV2 (COVID) with Flu Antigen  -     POCT rapid strep A    Chronic deep vein thrombosis (DVT) of left lower extremity, unspecified vein  -     POCT PT/INR  The following orders have not been finalized:  -     warfarin (COUMADIN) 5 MG tablet    Protein S deficiency  -     POCT PT/INR    Anemia, unspecified type  -     Basic Metabolic Panel; Future  -     CBC Auto Differential; Future  -     Vitamin B12; Future  -     Folate; Future  -     Iron and TIBC; Future  -     Ferritin; Future  -     Reticulocytes; Future  -     Basic Metabolic Panel  -     CBC Auto Differential  -     Vitamin B12  -     Folate  -     Iron and TIBC  -     Ferritin  -     Reticulocytes    Chronic deep vein thrombosis (DVT) of other vein of both lower extremities  The following orders have not been finalized:  -     folic acid (FOLVITE) 1 MG tablet    Vitamin D deficiency  The following orders have not been finalized:  -     ferrous sulfate 324 mg (65 mg iron) TbEC    Thalassemia, unspecified type                   "

## 2022-09-08 RX ORDER — WARFARIN SODIUM 5 MG/1
5 TABLET ORAL DAILY
Qty: 30 TABLET | Refills: 4 | Status: SHIPPED | OUTPATIENT
Start: 2022-09-08 | End: 2023-02-28 | Stop reason: SDUPTHER

## 2022-09-08 RX ORDER — FERROUS SULFATE 324(65)MG
324 TABLET, DELAYED RELEASE (ENTERIC COATED) ORAL DAILY
Qty: 90 TABLET | Refills: 1 | Status: SHIPPED | OUTPATIENT
Start: 2022-09-08 | End: 2023-02-28 | Stop reason: SDUPTHER

## 2022-09-08 RX ORDER — FOLIC ACID 1 MG/1
1 TABLET ORAL DAILY
Qty: 30 TABLET | Refills: 2 | Status: SHIPPED | OUTPATIENT
Start: 2022-09-08 | End: 2023-02-28 | Stop reason: SDUPTHER

## 2022-12-15 ENCOUNTER — OFFICE VISIT (OUTPATIENT)
Dept: FAMILY MEDICINE | Facility: CLINIC | Age: 52
End: 2022-12-15
Payer: COMMERCIAL

## 2022-12-15 VITALS
TEMPERATURE: 98 F | DIASTOLIC BLOOD PRESSURE: 63 MMHG | OXYGEN SATURATION: 96 % | RESPIRATION RATE: 18 BRPM | HEIGHT: 64 IN | BODY MASS INDEX: 29.88 KG/M2 | HEART RATE: 95 BPM | WEIGHT: 175 LBS | SYSTOLIC BLOOD PRESSURE: 108 MMHG

## 2022-12-15 DIAGNOSIS — R50.9 FEVER, UNSPECIFIED FEVER CAUSE: ICD-10-CM

## 2022-12-15 DIAGNOSIS — J02.9 SORE THROAT: ICD-10-CM

## 2022-12-15 DIAGNOSIS — R05.9 COUGH, UNSPECIFIED TYPE: ICD-10-CM

## 2022-12-15 DIAGNOSIS — U07.1 COVID-19: Primary | ICD-10-CM

## 2022-12-15 LAB
CTP QC/QA: YES
CTP QC/QA: YES
FLUAV AG NPH QL: NEGATIVE
FLUBV AG NPH QL: NEGATIVE
S PYO RRNA THROAT QL PROBE: NEGATIVE
SARS-COV-2 AG RESP QL IA.RAPID: POSITIVE

## 2022-12-15 PROCEDURE — 87428 POCT SARS-COV2 (COVID) WITH FLU ANTIGEN: ICD-10-PCS | Mod: QW,,, | Performed by: NURSE PRACTITIONER

## 2022-12-15 PROCEDURE — 87880 STREP A ASSAY W/OPTIC: CPT | Mod: QW,,, | Performed by: NURSE PRACTITIONER

## 2022-12-15 PROCEDURE — 99213 PR OFFICE/OUTPT VISIT, EST, LEVL III, 20-29 MIN: ICD-10-PCS | Mod: ,,, | Performed by: NURSE PRACTITIONER

## 2022-12-15 PROCEDURE — 87428 SARSCOV & INF VIR A&B AG IA: CPT | Mod: QW,,, | Performed by: NURSE PRACTITIONER

## 2022-12-15 PROCEDURE — 99213 OFFICE O/P EST LOW 20 MIN: CPT | Mod: ,,, | Performed by: NURSE PRACTITIONER

## 2022-12-15 PROCEDURE — 87880 POCT RAPID STREP A: ICD-10-PCS | Mod: QW,,, | Performed by: NURSE PRACTITIONER

## 2022-12-15 NOTE — PATIENT INSTRUCTIONS
Push Fluids  Activity as tolerated   Quarantine 5 days  Return to work  12/21/2022  Zinc 50 mg one tablet twice daily   Vitamin C 500 mg one tablet twice daily   Alternate Tylenol and Ibuprofen for fever and headaches   Rynex DM 20 mL every 6 hours as needed for cough & congestion (sample give x 1 dose)   Follow up with primary care provider sooner if symptoms persist, worsen, or new symptoms develop

## 2022-12-15 NOTE — PROGRESS NOTES
Subjective:       Patient ID: Karli Singh is a 52 y.o. female.    Chief Complaint: Fatigue, Fever, Nasal Congestion, Sore Throat, Cough, Nausea, Dizziness, Headache, Chills (/), Generalized Body Aches, and no taste (Onset s/s 4days )      HPI     52 y.o. patient who presents to [facility] for COVID-19 testing.     Symptoms: fatigue, fever, congestion, sore throat, cough, nausea, dizziness, weakness, headaches, chills, body aches, and no taste  Onset: 4 days  Known COVID-19 exposure: yes  Employment: [employer]   Last shift: 12/10/2022  Tobacco use: No  PMH: Deep Venous Thrombus of left lower extremity, Hx of Protein S Deficiency Disease, Vitamin D Deficiency, Vitamin B 12 Deficiency, and Anemia  COVID-19 Vaccine taken: no       Review of Systems   Constitutional: positive for fatigue and positive for fever.   HENT: positive for nasal congestion. positive for sore throat.    Respiratory: positive for cough and negative for shortness of breath.    Cardiovascular: negative for chest pain.   Gastrointestinal: negative for abdominal pain, negative for constipation, negative for diarrhea, negative for nausea and negative for vomiting.   Genitourinary: negative for dysuria.   Integumentary:  negative for rash.   Neurological: positive for dizziness, positive for weakness and positive for headaches.   All other systems reviewed and are negative.      Objective:          Physical Exam    Complete Physical Examination not completed due to patient in vehicle and maintaining universal precautions.    LAB POCT RESULTS:    Lab Results   Component Value Date    [id number] Positive (A) 12/15/2022    RAPFLUA Negative 12/15/2022    RAPFLUB Negative 12/15/2022        RAPID STREP                                             Negative                           12/15/2022        Assessment:       1. COVID-19    2. Fever, unspecified fever cause    3. Sore throat    4. Cough, unspecified type           Plan:   Push Fluids  Activity as tolerated   Quarantine 5 days  Return to work  12/21/2022  Zinc 50 mg one tablet twice daily   Vitamin C 500 mg one tablet twice daily   Alternate Tylenol and Ibuprofen for fever and headaches   Rynex DM 20 mL every 6 hours as needed for cough & congestion (sample give x 1 dose) lot # [id number] ex[ 07/2025 2 bottles 10 mL each (total 20 mL)  Follow up with primary care provider sooner if symptoms persist, worsen, or new symptoms develop        MEHRAN BrennanPCNP-[facility]

## 2023-02-28 ENCOUNTER — OFFICE VISIT (OUTPATIENT)
Dept: FAMILY MEDICINE | Facility: CLINIC | Age: 53
End: 2023-02-28
Payer: COMMERCIAL

## 2023-02-28 VITALS
HEIGHT: 64 IN | HEART RATE: 80 BPM | DIASTOLIC BLOOD PRESSURE: 68 MMHG | OXYGEN SATURATION: 99 % | WEIGHT: 177.38 LBS | SYSTOLIC BLOOD PRESSURE: 134 MMHG | BODY MASS INDEX: 30.28 KG/M2 | RESPIRATION RATE: 18 BRPM | TEMPERATURE: 98 F

## 2023-02-28 DIAGNOSIS — D68.59 PROTEIN S DEFICIENCY: Primary | ICD-10-CM

## 2023-02-28 DIAGNOSIS — I82.593 CHRONIC DEEP VEIN THROMBOSIS (DVT) OF OTHER VEIN OF BOTH LOWER EXTREMITIES: ICD-10-CM

## 2023-02-28 DIAGNOSIS — F33.0 MILD EPISODE OF RECURRENT MAJOR DEPRESSIVE DISORDER: ICD-10-CM

## 2023-02-28 DIAGNOSIS — E55.9 VITAMIN D DEFICIENCY: ICD-10-CM

## 2023-02-28 DIAGNOSIS — F41.9 ANXIETY: ICD-10-CM

## 2023-02-28 DIAGNOSIS — I82.502 CHRONIC DEEP VEIN THROMBOSIS (DVT) OF LEFT LOWER EXTREMITY, UNSPECIFIED VEIN: ICD-10-CM

## 2023-02-28 PROCEDURE — 99214 OFFICE O/P EST MOD 30 MIN: CPT | Mod: ,,, | Performed by: INTERNAL MEDICINE

## 2023-02-28 PROCEDURE — 99214 PR OFFICE/OUTPT VISIT, EST, LEVL IV, 30-39 MIN: ICD-10-PCS | Mod: ,,, | Performed by: INTERNAL MEDICINE

## 2023-02-28 RX ORDER — FERROUS SULFATE 324(65)MG
324 TABLET, DELAYED RELEASE (ENTERIC COATED) ORAL DAILY
Qty: 90 TABLET | Refills: 1 | Status: SHIPPED | OUTPATIENT
Start: 2023-02-28 | End: 2024-02-05 | Stop reason: SDUPTHER

## 2023-02-28 RX ORDER — WARFARIN SODIUM 5 MG/1
5 TABLET ORAL DAILY
Qty: 30 TABLET | Refills: 4 | Status: SHIPPED | OUTPATIENT
Start: 2023-02-28 | End: 2024-02-05 | Stop reason: SDUPTHER

## 2023-02-28 RX ORDER — PAROXETINE HYDROCHLORIDE 20 MG/1
20 TABLET, FILM COATED ORAL NIGHTLY
Qty: 30 TABLET | Refills: 1 | Status: SHIPPED | OUTPATIENT
Start: 2023-02-28 | End: 2024-01-03

## 2023-02-28 RX ORDER — PNV NO.95/FERROUS FUM/FOLIC AC 28MG-0.8MG
100 TABLET ORAL DAILY
Qty: 90 TABLET | Refills: 1 | Status: SHIPPED | OUTPATIENT
Start: 2023-02-28 | End: 2024-01-03 | Stop reason: DRUGHIGH

## 2023-02-28 RX ORDER — FOLIC ACID 1 MG/1
1 TABLET ORAL DAILY
Qty: 30 TABLET | Refills: 2 | Status: SHIPPED | OUTPATIENT
Start: 2023-02-28 | End: 2024-02-05 | Stop reason: SDUPTHER

## 2023-03-01 LAB
INR BLD: 1.19
PROTHROMBIN TIME: 14.6 SECONDS (ref 11.7–14.7)

## 2023-03-01 NOTE — PATIENT INSTRUCTIONS
Karli was seen today for stress.    Diagnoses and all orders for this visit:    Protein S deficiency  -     Cancel: POCT PT/INR; Future  -     Protime-INR; Future  -     Protime-INR    Vitamin D deficiency  -     ferrous sulfate 324 mg (65 mg iron) TbEC; Take 1 tablet (324 mg total) by mouth once daily.    Chronic deep vein thrombosis (DVT) of other vein of both lower extremities  -     folic acid (FOLVITE) 1 MG tablet; Take 1 tablet (1 mg total) by mouth once daily.    Chronic deep vein thrombosis (DVT) of left lower extremity, unspecified vein  -     warfarin (COUMADIN) 5 MG tablet; Take 1 tablet (5 mg total) by mouth Daily. At hour of sleep    Anxiety    Mild episode of recurrent major depressive disorder    Other orders  -     cyanocobalamin (VITAMIN B-12) 100 MCG tablet; Take 1 tablet (100 mcg total) by mouth once daily.  -     paroxetine (PAXIL) 20 MG tablet; Take 1 tablet (20 mg total) by mouth every evening.

## 2023-03-01 NOTE — PROGRESS NOTES
"Subjective:       Patient ID: Karli Singh is a 52 y.o. female.    Chief Complaint: Stress    Patient is here today for a follow up evaluation. Patient blood pressure is stable today on intake, 134/68. Patient has a complaint of anxiety and "bad nerves". Patient states she extremely stressed. Discussed with patient different medications to help with anxiety and stress. Will order Paxil 20mg PO QHS #30 RF1. Patient has a history of protein deficiency. Will order INR/PT. Will follow with patient in 3 weeks.     .    Current Medications:    Current Outpatient Medications:     brompheniramin-phenylephrin-DM (RYNEX DM) 1-2.5-5 mg/5 mL Soln, Take 20 mLs by mouth every 6 (six) hours as needed (cough & congestion). (Patient not taking: Reported on 2/28/2023), Disp: 473 mL, Rfl: 0    cyanocobalamin (VITAMIN B-12) 100 MCG tablet, Take 1 tablet (100 mcg total) by mouth once daily., Disp: 90 tablet, Rfl: 1    ergocalciferol (ERGOCALCIFEROL) 50,000 unit Cap, Take 1 capsule (50,000 Units total) by mouth every 7 days. (Patient not taking: Reported on 2/28/2023), Disp: 12 capsule, Rfl: 0    ferrous sulfate 324 mg (65 mg iron) TbEC, Take 1 tablet (324 mg total) by mouth once daily., Disp: 90 tablet, Rfl: 1    folic acid (FOLVITE) 1 MG tablet, Take 1 tablet (1 mg total) by mouth once daily., Disp: 30 tablet, Rfl: 2    paroxetine (PAXIL) 20 MG tablet, Take 1 tablet (20 mg total) by mouth every evening., Disp: 30 tablet, Rfl: 1    warfarin (COUMADIN) 5 MG tablet, Take 1 tablet (5 mg total) by mouth Daily. At hour of sleep, Disp: 30 tablet, Rfl: 4           Review of Systems   All other systems reviewed and are negative.             Vitals:    02/28/23 1002   BP: 134/68   BP Location: Left arm   Patient Position: Sitting   BP Method: Large (Automatic)   Pulse: 80   Resp: 18   Temp: 97.9 °F (36.6 °C)   TempSrc: Temporal   SpO2: 99%   Weight: 80.5 kg (177 lb 6.4 oz)   Height: 5' 4" (1.626 m)        Physical Exam  Vitals reviewed. "   Constitutional:       Appearance: Normal appearance. She is normal weight.   Cardiovascular:      Rate and Rhythm: Normal rate and regular rhythm.      Pulses: Normal pulses.      Heart sounds: Normal heart sounds.   Pulmonary:      Effort: Pulmonary effort is normal.      Breath sounds: Normal breath sounds.   Abdominal:      General: Abdomen is flat. Bowel sounds are normal.      Palpations: Abdomen is soft.   Musculoskeletal:         General: Normal range of motion.      Cervical back: Normal range of motion and neck supple.   Skin:     General: Skin is warm and dry.   Neurological:      General: No focal deficit present.      Mental Status: She is alert and oriented to person, place, and time. Mental status is at baseline.         Last Labs:     No visits with results within 1 Month(s) from this visit.   Latest known visit with results is:   Office Visit on 12/15/2022   Component Date Value    SARS Coronavirus 2 Antig* 12/15/2022 Positive (A)     Rapid Influenza A Ag 12/15/2022 Negative     Rapid Influenza B Ag 12/15/2022 Negative      Acceptab* 12/15/2022 Yes     Rapid Strep A Screen 12/15/2022 Negative      Acceptab* 12/15/2022 Yes        Last Imaging:  Mammo Digital Screening Bilat  Narrative: Result:   Mammo Digital Screening Bilat     History:  Patient is 51 y.o. and is seen for a screening mammogram.    Films Compared:  Prior images (if available) were compared.     Findings:  The breasts have scattered areas of fibroglandular density. There is no   evidence of suspicious masses, calcifications, or other abnormal findings.  Impression: Bilateral  There is no mammographic evidence of malignancy.    BI-RADS Category:   Overall: 1 - Negative     Recommendation:  Routine screening mammogram in 1 year is recommended.    Your estimated lifetime risk of breast cancer (to age 85) based on   Tyrer-Cuzick risk assessment model is Tyrer-Cuzick: 7.06 %. According to   the American  Cancer Society, patients with a lifetime breast cancer risk   of 20% or higher might benefit from supplemental screening tests.         **Labs and x-rays personally reviewed by me    ** reviewed      Objective:        Assessment:       1. Protein S deficiency  Protime-INR    Protime-INR    CANCELED: POCT PT/INR      2. Vitamin D deficiency  ferrous sulfate 324 mg (65 mg iron) TbEC      3. Chronic deep vein thrombosis (DVT) of other vein of both lower extremities  folic acid (FOLVITE) 1 MG tablet      4. Chronic deep vein thrombosis (DVT) of left lower extremity, unspecified vein  warfarin (COUMADIN) 5 MG tablet      5. Anxiety        6. Mild episode of recurrent major depressive disorder             Plan:         [unfilled]

## 2023-07-20 ENCOUNTER — PATIENT MESSAGE (OUTPATIENT)
Dept: ADMINISTRATIVE | Facility: HOSPITAL | Age: 53
End: 2023-07-20

## 2024-01-02 ENCOUNTER — TELEPHONE (OUTPATIENT)
Dept: FAMILY MEDICINE | Facility: CLINIC | Age: 54
End: 2024-01-02
Payer: COMMERCIAL

## 2024-01-02 NOTE — TELEPHONE ENCOUNTER
Confirmed pt's upcoming appointment on 1/3 and instructed her to bring her medication bottles.    Georgaina Miller CMA

## 2024-01-03 ENCOUNTER — OFFICE VISIT (OUTPATIENT)
Dept: FAMILY MEDICINE | Facility: CLINIC | Age: 54
End: 2024-01-03
Payer: COMMERCIAL

## 2024-01-03 ENCOUNTER — HOSPITAL ENCOUNTER (OUTPATIENT)
Dept: RADIOLOGY | Facility: HOSPITAL | Age: 54
Discharge: HOME | End: 2024-01-03
Attending: INTERNAL MEDICINE
Payer: COMMERCIAL

## 2024-01-03 VITALS
SYSTOLIC BLOOD PRESSURE: 142 MMHG | HEART RATE: 77 BPM | BODY MASS INDEX: 31.04 KG/M2 | RESPIRATION RATE: 18 BRPM | OXYGEN SATURATION: 98 % | TEMPERATURE: 98 F | WEIGHT: 181.81 LBS | DIASTOLIC BLOOD PRESSURE: 82 MMHG | HEIGHT: 64 IN

## 2024-01-03 VITALS — BODY MASS INDEX: 30.73 KG/M2 | HEIGHT: 64 IN | WEIGHT: 180 LBS

## 2024-01-03 DIAGNOSIS — Z12.4 SCREENING FOR CERVICAL CANCER: ICD-10-CM

## 2024-01-03 DIAGNOSIS — E55.9 VITAMIN D DEFICIENCY: ICD-10-CM

## 2024-01-03 DIAGNOSIS — D64.9 ANEMIA, UNSPECIFIED TYPE: Primary | ICD-10-CM

## 2024-01-03 DIAGNOSIS — Z79.01 CURRENT LONG-TERM USE OF ANTICOAGULANT MEDICATION WITH HISTORY OF DEEP VENOUS THROMBOSIS (DVT): Chronic | ICD-10-CM

## 2024-01-03 DIAGNOSIS — E66.9 OBESITY (BMI 30.0-34.9): ICD-10-CM

## 2024-01-03 DIAGNOSIS — Z02.0 ENCOUNTER FOR SCHOOL HISTORY AND PHYSICAL EXAMINATION: ICD-10-CM

## 2024-01-03 DIAGNOSIS — Z13.29 SCREENING FOR THYROID DISORDER: ICD-10-CM

## 2024-01-03 DIAGNOSIS — E53.8 VITAMIN B12 DEFICIENCY: ICD-10-CM

## 2024-01-03 DIAGNOSIS — D68.59 PROTEIN S DEFICIENCY: ICD-10-CM

## 2024-01-03 DIAGNOSIS — Z12.31 ENCOUNTER FOR SCREENING MAMMOGRAM FOR MALIGNANT NEOPLASM OF BREAST: ICD-10-CM

## 2024-01-03 DIAGNOSIS — Z12.31 OTHER SCREENING MAMMOGRAM: ICD-10-CM

## 2024-01-03 DIAGNOSIS — Z86.718 CURRENT LONG-TERM USE OF ANTICOAGULANT MEDICATION WITH HISTORY OF DEEP VENOUS THROMBOSIS (DVT): Chronic | ICD-10-CM

## 2024-01-03 DIAGNOSIS — Z12.11 SCREENING FOR COLON CANCER: ICD-10-CM

## 2024-01-03 LAB
25(OH)D3 SERPL-MCNC: 29.4 NG/ML
ALBUMIN SERPL BCP-MCNC: 3.5 G/DL (ref 3.5–5)
ALBUMIN/GLOB SERPL: 0.9 {RATIO}
ALP SERPL-CCNC: 97 U/L (ref 46–118)
ALT SERPL W P-5'-P-CCNC: 17 U/L (ref 13–56)
ANION GAP SERPL CALCULATED.3IONS-SCNC: 8 MMOL/L (ref 7–16)
ANISOCYTOSIS BLD QL SMEAR: ABNORMAL
AST SERPL W P-5'-P-CCNC: 23 U/L (ref 15–37)
BASOPHILS # BLD AUTO: 0.03 K/UL (ref 0–0.2)
BASOPHILS NFR BLD AUTO: 0.6 % (ref 0–1)
BILIRUB SERPL-MCNC: 0.5 MG/DL (ref ?–1.2)
BUN SERPL-MCNC: 6 MG/DL (ref 7–18)
BUN/CREAT SERPL: 7 (ref 6–20)
CALCIUM SERPL-MCNC: 8.7 MG/DL (ref 8.5–10.1)
CHLORIDE SERPL-SCNC: 107 MMOL/L (ref 98–107)
CHOLEST SERPL-MCNC: 228 MG/DL (ref 0–200)
CHOLEST/HDLC SERPL: 3 {RATIO}
CO2 SERPL-SCNC: 32 MMOL/L (ref 21–32)
CREAT SERPL-MCNC: 0.92 MG/DL (ref 0.55–1.02)
DIFFERENTIAL METHOD BLD: ABNORMAL
EGFR (NO RACE VARIABLE) (RUSH/TITUS): 74 ML/MIN/1.73M2
EOSINOPHIL # BLD AUTO: 0.03 K/UL (ref 0–0.5)
EOSINOPHIL NFR BLD AUTO: 0.6 % (ref 1–4)
ERYTHROCYTE [DISTWIDTH] IN BLOOD BY AUTOMATED COUNT: 18.5 % (ref 11.5–14.5)
GLOBULIN SER-MCNC: 4.1 G/DL (ref 2–4)
GLUCOSE SERPL-MCNC: 88 MG/DL (ref 74–106)
HCT VFR BLD AUTO: 33.5 % (ref 38–47)
HDLC SERPL-MCNC: 75 MG/DL (ref 40–60)
HGB BLD-MCNC: 10 G/DL (ref 12–16)
IMM GRANULOCYTES # BLD AUTO: 0.02 K/UL (ref 0–0.04)
IMM GRANULOCYTES NFR BLD: 0.4 % (ref 0–0.4)
INR BLD: 1.07
LDLC SERPL CALC-MCNC: 141 MG/DL
LDLC/HDLC SERPL: 1.9 {RATIO}
LYMPHOCYTES # BLD AUTO: 1.89 K/UL (ref 1–4.8)
LYMPHOCYTES NFR BLD AUTO: 35.9 % (ref 27–41)
MCH RBC QN AUTO: 18.3 PG (ref 27–31)
MCHC RBC AUTO-ENTMCNC: 29.9 G/DL (ref 32–36)
MCV RBC AUTO: 61.2 FL (ref 80–96)
MICROCYTES BLD QL SMEAR: ABNORMAL
MONOCYTES # BLD AUTO: 0.45 K/UL (ref 0–0.8)
MONOCYTES NFR BLD AUTO: 8.5 % (ref 2–6)
MPC BLD CALC-MCNC: ABNORMAL G/DL
NEUTROPHILS # BLD AUTO: 2.85 K/UL (ref 1.8–7.7)
NEUTROPHILS NFR BLD AUTO: 54 % (ref 53–65)
NONHDLC SERPL-MCNC: 153 MG/DL
NRBC # BLD AUTO: 0 X10E3/UL
NRBC, AUTO (.00): 0 %
PLATELET # BLD AUTO: 197 K/UL (ref 150–400)
PLATELET MORPHOLOGY: ABNORMAL
POTASSIUM SERPL-SCNC: 3.9 MMOL/L (ref 3.5–5.1)
PROT SERPL-MCNC: 7.6 G/DL (ref 6.4–8.2)
PROTHROMBIN TIME: 13.8 SECONDS (ref 11.7–14.7)
RBC # BLD AUTO: 5.47 M/UL (ref 4.2–5.4)
SODIUM SERPL-SCNC: 143 MMOL/L (ref 136–145)
TRIGL SERPL-MCNC: 59 MG/DL (ref 35–150)
TSH SERPL DL<=0.005 MIU/L-ACNC: 3.15 UIU/ML (ref 0.36–3.74)
VIT B12 SERPL-MCNC: 890 PG/ML (ref 193–986)
VLDLC SERPL-MCNC: 12 MG/DL
WBC # BLD AUTO: 5.27 K/UL (ref 4.5–11)

## 2024-01-03 PROCEDURE — 84443 ASSAY THYROID STIM HORMONE: CPT | Mod: ,,, | Performed by: CLINICAL MEDICAL LABORATORY

## 2024-01-03 PROCEDURE — 85025 COMPLETE CBC W/AUTO DIFF WBC: CPT | Mod: ,,, | Performed by: CLINICAL MEDICAL LABORATORY

## 2024-01-03 PROCEDURE — 82607 VITAMIN B-12: CPT | Mod: ,,, | Performed by: CLINICAL MEDICAL LABORATORY

## 2024-01-03 PROCEDURE — 80061 LIPID PANEL: CPT | Mod: ,,, | Performed by: CLINICAL MEDICAL LABORATORY

## 2024-01-03 PROCEDURE — 86787 VARICELLA-ZOSTER ANTIBODY: CPT | Mod: ,,, | Performed by: CLINICAL MEDICAL LABORATORY

## 2024-01-03 PROCEDURE — 77067 SCR MAMMO BI INCL CAD: CPT | Mod: TC

## 2024-01-03 PROCEDURE — 99213 OFFICE O/P EST LOW 20 MIN: CPT | Mod: ,,, | Performed by: NURSE PRACTITIONER

## 2024-01-03 PROCEDURE — 80053 COMPREHEN METABOLIC PANEL: CPT | Mod: ,,, | Performed by: CLINICAL MEDICAL LABORATORY

## 2024-01-03 PROCEDURE — 82306 VITAMIN D 25 HYDROXY: CPT | Mod: ,,, | Performed by: CLINICAL MEDICAL LABORATORY

## 2024-01-03 RX ORDER — LANOLIN ALCOHOL/MO/W.PET/CERES
100 CREAM (GRAM) TOPICAL DAILY
COMMUNITY
End: 2024-02-05 | Stop reason: SDUPTHER

## 2024-01-03 NOTE — PROGRESS NOTES
[facility]  Chief Complaint      Chief Complaint   Patient presents with    Physical Exam     Physical for admittance to the Associate Degree Nursing Program at [facility]     Hx of DVT     On warfarin, last INR unknown     Anemia     On B 12, Folic Acid, and Ferrous Sulfate, due for repeat labs today        History of Present Illness      Karli Singh is a 53 y.o. female with chronic conditions of Anemia, Vitamin B 12 Deficiency, Vitamin D Deficiency, Hx of Deep Venous Thrombus of left lower extremity,  Protein S Deficiency, long term anticoagulant use, and Obesity who presents today for physical for admission to Associate Degree Nursing at [facility]. She get her primary care needs met by Dr. Derek Bar. 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 She is not . She has one son age 32 (Cam).  Her daily routine medications include Ferrous Sulfate, Folic Acid, Cyanocobalamin, and Warfarin. She reports having been on Xarelto in the past but had neuropathy type symptoms associated with this and was changed to Eliquis which she tolerated well. She reports her formulary changed and she was unable to get Eliquis and was switched to warfarin at that time. She is interested in getting back on Eliquis and will follow up with her PCP to discuss this change. Immunization record reviewed. She is up-to-date with her Tdap 06/21/2027, two doses of MMR #1 04/03/1973 #2 08/25/2021, Influenza 10/30/2023, Hep C #1 10/17/2021 #2 06/17/2022 #3 05/15/73359, one dose of shingles vaccine 08/19/2021 instructed to get 2nd dose at local retail pharmacy. Mammogram ordered today with instructions to go today after 1300 as a walk-in. Colonoscopy consult placed today. Last colonoscopy was 10/31/2018 by Dr. Abel with recommendations to repeat in 5 years. Last pap 09/29/2020 by Dr. Luis E Duke with  "recommendations to repeat in 3 years.        Past Medical History:  Past Medical History:   Diagnosis Date    Anemia     Deep vein thrombosis (DVT) of left lower extremity     Protein S deficiency        Past Surgical History:   has a past surgical history that includes [place] tooth extraction.    Social History:  Social History     Tobacco Use    Smoking status: Never    Smokeless tobacco: Never   Substance Use Topics    Alcohol use: Never    Drug use: Never       I personally reviewed all past medical, surgical, and social.     Review of Systems   Constitutional:  Negative for chills and fever.   HENT:  Positive for rhinorrhea ('sometimes"). Negative for congestion, hearing loss, sore throat and trouble swallowing.    Eyes:  Negative for visual disturbance.   Respiratory:  Negative for cough and shortness of breath.    Cardiovascular:  Positive for leg swelling ("if I don't keep them propped up"). Negative for chest pain.   Gastrointestinal:  Negative for abdominal pain, constipation and diarrhea.   Genitourinary:  Negative for dysuria, frequency and urgency.   Musculoskeletal:  Negative for arthralgias, back pain, myalgias and neck pain.   Neurological:  Negative for dizziness and headaches.   Psychiatric/Behavioral:  Negative for decreased concentration, dysphoric mood and sleep disturbance. The patient is not nervous/anxious.         Medications:  Outpatient Encounter Medications as of 1/3/2024   Medication Sig Dispense Refill    ferrous sulfate 324 mg (65 mg iron) TbEC Take 1 tablet (324 mg total) by mouth once daily. 90 tablet 1    folic acid (FOLVITE) 1 MG tablet Take 1 tablet (1 mg total) by mouth once daily. 30 tablet 2    warfarin (COUMADIN) 5 MG tablet Take 1 tablet (5 mg total) by mouth Daily. At hour of sleep 30 tablet 4    [DISCONTINUED] cyanocobalamin (VITAMIN B-12) 100 MCG tablet Take 1 tablet (100 mcg total) by mouth once daily. (Patient taking differently: Take 1,000 mcg by mouth once daily.) 90 " "tablet 1    cyanocobalamin (VITAMIN B-12) 1000 MCG tablet Take 100 mcg by mouth once daily.      [DISCONTINUED] brompheniramin-phenylephrin-DM (RYNEX DM) 1-2.5-5 mg/5 mL Soln Take 20 mLs by mouth every 6 (six) hours as needed (cough & congestion). (Patient not taking: Reported on 2/28/2023) 473 mL 0    [DISCONTINUED] ergocalciferol (ERGOCALCIFEROL) 50,000 unit Cap Take 1 capsule (50,000 Units total) by mouth every 7 days. (Patient not taking: Reported on 2/28/2023) 12 capsule 0    [DISCONTINUED] paroxetine (PAXIL) 20 MG tablet Take 1 tablet (20 mg total) by mouth every evening. (Patient not taking: Reported on 1/3/2024) 30 tablet 1     No facility-administered encounter medications on file as of 1/3/2024.       Allergies:  Review of patient's allergies indicates:   Allergen Reactions    Naprosyn [naproxen]        Health Maintenance:  Immunization History   Administered Date(s) Administered    COVID-19 MRNA, LN-S PF (MODERNA HALF 0.25 ML DOSE) 12/24/2021    COVID-19, MRNA, LN-S, PF (MODERNA FULL 0.5 ML DOSE) 12/30/2020, 01/27/2021    Hepatitis B, Adult 10/17/2001, 05/15/2002, 06/17/2002    Influenza - Quadrivalent 10/30/2023    MMR 04/03/1973, 08/25/2021    Tdap 06/21/2017    Zoster Recombinant 08/19/2021        Health Maintenance   Topic Date Due    Shingles Vaccine (2 of 2) 10/14/2021-instructed to get at local retail pharmacy    Mammogram  02/23/2023-ordered 01/03/2024 will go as walk-in     Colorectal Cancer Screening  10/31/2023-consult placed 01/03/2024    Lipid Panel  02/23/2027    TETANUS VACCINE  06/21/2027    Hepatitis C Screening  Completed        Physical Exam      Vital Signs  Temp: 98 °F (36.7 °C)  Pulse: 77  Resp: 18  SpO2: 98 %  BP: (!) 142/82  BP Location: Right arm  Patient Position: Sitting  Pain Score: 0-No pain  Height and Weight  Height: 5' 4" (162.6 cm)  Weight: 82.5 kg (181 lb 12.8 oz)  BSA (Calculated - sq m): 1.93 sq meters  BMI (Calculated): 31.2  Weight in (lb) to have BMI = 25: " 145.3]    Physical Exam  Constitutional:       General: She is not in acute distress.     Appearance: She is obese.   HENT:      Head: Normocephalic.      Right Ear: Tympanic membrane, ear canal and external ear normal. There is no impacted cerumen.      Left Ear: Tympanic membrane, ear canal and external ear normal. There is no impacted cerumen.   Cardiovascular:      Rate and Rhythm: Normal rate and regular rhythm.      Pulses: Normal pulses.      Heart sounds: Normal heart sounds.   Pulmonary:      Effort: Pulmonary effort is normal.      Breath sounds: Normal breath sounds.   Abdominal:      General: Bowel sounds are normal.      Palpations: Abdomen is soft.      Tenderness: There is no abdominal tenderness.   Musculoskeletal:         General: Normal range of motion.      Cervical back: Normal range of motion.   Skin:     General: Skin is warm and dry.   Neurological:      Mental Status: She is alert and oriented to person, place, and time.   Psychiatric:         Mood and Affect: Mood normal.         Behavior: Behavior normal.          Laboratory:    Lab Results   Component Value Date    GLU 81 09/07/2022     09/07/2022    K 3.6 09/07/2022     09/07/2022    CO2 28 09/07/2022    BUN 8 09/07/2022    CREATININE 0.92 09/07/2022    CALCIUM 9.0 09/07/2022    PROT 7.7 03/25/2021    ALBUMIN 3.5 03/25/2021    BILITOT 0.4 03/25/2021    ALKPHOS 105 03/25/2021    AST 16 03/25/2021    ALT 14 03/25/2021    ANIONGAP 11 09/07/2022    ESTGFRAFRICA 88 03/25/2021    EGFRNONAA 73 03/25/2021       Lab Results   Component Value Date    WBC 5.41 09/07/2022    RBC 5.59 (H) 09/07/2022    HGB 10.3 (L) 09/07/2022    HCT 34.1 (L) 09/07/2022    MCV 61.0 (L) 09/07/2022    RDW 18.7 (H) 09/07/2022     09/07/2022        Lab Results   Component Value Date    CHOL 184 02/23/2022    TRIG 50 02/23/2022    HDL 75 (H) 02/23/2022    LDLCALC 99 02/23/2022       Lab Results   Component Value Date    TSH 2.530 03/25/2021       Lab  Results   Component Value Date    HGBA1C 5.3 02/23/2022    ESTIMATEDAVG 90 02/23/2022        Lab Results   Component Value Date    YQXJQUIT88 304 09/07/2022       Lab Results   Component Value Date    RXBOBQDW37TT 54.4 01/06/2022       Point Of Care Testing:  Nitrites, UA   Date Value Ref Range Status   03/25/2021 Negative Negative Final     Urobilinogen, UA   Date Value Ref Range Status   03/25/2021 1.0 (A) Normal,0.2,1 EU/DL Final     pH, UA   Date Value Ref Range Status   03/25/2021 6.0 5.0 - 8.0 pH Units Final     Specific Gravity, UA   Date Value Ref Range Status   03/25/2021 1.020 1.000 - 1.030 Final     Ketones, UA   Date Value Ref Range Status   03/25/2021 Negative Negative mg/dL Final       Lab Results   Component Value Date    SXASVUR7GK Positive (A) 12/15/2022    RAPFLUA Negative 12/15/2022    RAPFLUB Negative 12/15/2022         Assessment/Plan     Anemia, unspecified type  -     CBC Auto Differential; Future; Expected date: 01/03/2024  -     take Ferrous Sulfate 324 mg once daily   -     take Folic Acid 1 mg daily     Vitamin D deficiency  -     Vitamin D; Future; Expected date: 01/03/2024    Vitamin B 12 deficiency  -     Vitamin B 12; Future; Expected date: 01/03/2024  -     takes OTC Vitamin B 12 1000 mcg once daily     Protein S deficiency  -     CBC Auto Differential; Future; Expected date: 01/03/2024  -     Comprehensive Metabolic Panel; Future; Expected date: 01/03/2024    Current long-term use of anticoagulant medication with history of deep venous thrombosis (DVT)  -     Protime-INR; Future; Expected date: 01/03/2024  -     on Warfarin 5 mg once daily   -     date of last INR unknown   -     will follow up with PCP to discuss changing back to Eliquis as I agree that this will help with compliance     Screening for thyroid disorder  -     TSH; Future; Expected date: 01/03/2024    Obesity (BMI 30.0-34.9)  -     Lipid Panel; Future; Expected date: 01/03/2024    Encounter for screening mammogram for  malignant neoplasm of breast  -     Mammo Digital Diagnostic Bilat with Natalio; Future; Expected date: 01/03/2024  -     last mammogram 02/23/2022 with recommendations to repeat in one year     Screening for colon cancer  -     Colonoscopy; Future; Expected date: 01/03/2024        -     last colonoscopy Dr. Abel 10/31/2018 with recommendations to repeat in 5 years    Encounter for school history and physical examination  -     Varicella Zoster Antibody, IgG; Future; Expected date: 01/03/2024    Screening for cervical cancer  -     Ambulatory referral/consult to Gynecology; Future; Expected date: 01/10/2024  -     last pap with HPV testing 09/29/2023 Dr. Duke with recommendations to repeat in 3 years        Follow up with PCP in one month and/or sooner as needed     Questions answered to desired level of satisfaction    Verbalized understanding to all information and instructions provided      [facility]

## 2024-01-03 NOTE — PATIENT INSTRUCTIONS
Please bring ALL medications bottles (from ALL providers) including over-the-counter medications to your next appointment !!!       Health Maintenance   Topic Date Due    TETANUS VACCINE  06/21/2027    Shingles Vaccine (2 of 2) 10/14/2021-get 2nd dose @ retail pharmacy    Mammogram  02/23/2023-scheduled can go as walk-in     Colorectal Cancer Screening  10/31/2023-ordered pending scheduling     Lipid Panel  02/23/2027    Hepatitis C Screening  Completed

## 2024-01-04 ENCOUNTER — TELEPHONE (OUTPATIENT)
Dept: FAMILY MEDICINE | Facility: CLINIC | Age: 54
End: 2024-01-04
Payer: COMMERCIAL

## 2024-01-04 NOTE — TELEPHONE ENCOUNTER
Returned call. Informed patient PCP is awaiting results from labs to finish documentation of paperwork. Informed we will contact her when paperwork is ready to be picked up. She verbalized understandinG....Sven Garcia LPN

## 2024-01-04 NOTE — TELEPHONE ENCOUNTER
----- Message from Jammie Tidwell sent at 1/4/2024  1:16 PM CST -----  CALLED ABOUT PAPERWORK THAT SHE NEEDS TO COME           [phone]

## 2024-01-09 DIAGNOSIS — Z79.01 CURRENT LONG-TERM USE OF ANTICOAGULANT MEDICATION WITH HISTORY OF DEEP VENOUS THROMBOSIS (DVT): Primary | ICD-10-CM

## 2024-01-09 DIAGNOSIS — Z86.718 CURRENT LONG-TERM USE OF ANTICOAGULANT MEDICATION WITH HISTORY OF DEEP VENOUS THROMBOSIS (DVT): Primary | ICD-10-CM

## 2024-01-10 LAB
VARICELLA ZOSTER, BLOOD: POSITIVE
VZV IGG INDEX: >3.5 (ref 0–0.9)

## 2024-01-24 ENCOUNTER — LAB VISIT (OUTPATIENT)
Dept: LAB | Facility: CLINIC | Age: 54
End: 2024-01-24
Payer: COMMERCIAL

## 2024-01-24 DIAGNOSIS — Z79.01 CURRENT LONG-TERM USE OF ANTICOAGULANT MEDICATION WITH HISTORY OF DEEP VENOUS THROMBOSIS (DVT): ICD-10-CM

## 2024-01-24 DIAGNOSIS — Z86.718 CURRENT LONG-TERM USE OF ANTICOAGULANT MEDICATION WITH HISTORY OF DEEP VENOUS THROMBOSIS (DVT): ICD-10-CM

## 2024-01-24 LAB
INR BLD: 2.02
PROTHROMBIN TIME: 22.5 SECONDS (ref 11.7–14.7)

## 2024-02-05 ENCOUNTER — OFFICE VISIT (OUTPATIENT)
Dept: FAMILY MEDICINE | Facility: CLINIC | Age: 54
End: 2024-02-05
Payer: COMMERCIAL

## 2024-02-05 VITALS
BODY MASS INDEX: 30.9 KG/M2 | TEMPERATURE: 97 F | SYSTOLIC BLOOD PRESSURE: 126 MMHG | WEIGHT: 181 LBS | DIASTOLIC BLOOD PRESSURE: 77 MMHG | OXYGEN SATURATION: 100 % | RESPIRATION RATE: 17 BRPM | HEART RATE: 75 BPM | HEIGHT: 64 IN

## 2024-02-05 DIAGNOSIS — I82.593 CHRONIC DEEP VEIN THROMBOSIS (DVT) OF OTHER VEIN OF BOTH LOWER EXTREMITIES: ICD-10-CM

## 2024-02-05 DIAGNOSIS — J30.1 ALLERGIC RHINITIS DUE TO POLLEN, UNSPECIFIED SEASONALITY: ICD-10-CM

## 2024-02-05 DIAGNOSIS — E55.9 VITAMIN D DEFICIENCY: ICD-10-CM

## 2024-02-05 DIAGNOSIS — D64.9 ANEMIA, UNSPECIFIED TYPE: Primary | ICD-10-CM

## 2024-02-05 DIAGNOSIS — I82.502 CHRONIC DEEP VEIN THROMBOSIS (DVT) OF LEFT LOWER EXTREMITY, UNSPECIFIED VEIN: ICD-10-CM

## 2024-02-05 PROCEDURE — 99214 OFFICE O/P EST MOD 30 MIN: CPT | Mod: ,,, | Performed by: INTERNAL MEDICINE

## 2024-02-05 RX ORDER — PNV NO.95/FERROUS FUM/FOLIC AC 28MG-0.8MG
100 TABLET ORAL DAILY
Qty: 30 TABLET | Refills: 0 | Status: SHIPPED | OUTPATIENT
Start: 2024-02-05

## 2024-02-05 RX ORDER — WARFARIN SODIUM 5 MG/1
5 TABLET ORAL DAILY
Qty: 30 TABLET | Refills: 4 | Status: SHIPPED | OUTPATIENT
Start: 2024-02-05

## 2024-02-05 RX ORDER — FOLIC ACID 1 MG/1
1 TABLET ORAL DAILY
Qty: 30 TABLET | Refills: 2 | Status: SHIPPED | OUTPATIENT
Start: 2024-02-05

## 2024-02-05 RX ORDER — FERROUS SULFATE 324(65)MG
324 TABLET, DELAYED RELEASE (ENTERIC COATED) ORAL DAILY
Qty: 90 TABLET | Refills: 1 | Status: SHIPPED | OUTPATIENT
Start: 2024-02-05

## 2024-02-12 RX ORDER — ACETAMINOPHEN, DEXTROMETHORPHAN HBR, DOXYLAMINE SUCCINATE, PHENYLEPHRINE HCL 650; 20; 12.5; 1 MG/30ML; MG/30ML; MG/30ML; MG/30ML
1000 SOLUTION ORAL DAILY
Qty: 30 TABLET | Refills: 0 | Status: SHIPPED | OUTPATIENT
Start: 2024-02-12

## 2024-02-14 PROBLEM — D64.9 ANEMIA: Status: ACTIVE | Noted: 2024-02-14

## 2024-02-14 PROBLEM — I82.503 CHRONIC DEEP VEIN THROMBOSIS (DVT) OF BOTH LOWER EXTREMITIES: Status: ACTIVE | Noted: 2022-02-23

## 2024-02-14 PROBLEM — J30.1 ALLERGIC RHINITIS DUE TO POLLEN: Status: ACTIVE | Noted: 2024-02-14

## 2024-02-15 NOTE — PROGRESS NOTES
"Subjective:       Patient ID: Karli Singh is a 53 y.o. female.    Chief Complaint: Medication Refill    Medication Refill      .  Patient seen and evaluated patient has had some history of postnasal drip need refills on medications.  Patient does have a history of protein S deficiency.  Patient does have actually history of anemia will check ferritin iron TIBC  Also going to refer to Dr. Smallwood hematologist.  The patient has anemia and I suspect the patient may have thalassemia will order a serum protein electrophoresis.  Patient has chronic vitamin B12 deficiency will refill vitamin B12 100 mics q.day. going to refill the ferrous sulfate and [place].    Will refill Coumadin 5 mg 1 p.o. q.day for protein S deficiency    Current Medications:    Current Outpatient Medications:     cyanocobalamin (VITAMIN B-12) 100 MCG tablet, Take 1 tablet (100 mcg total) by mouth once daily., Disp: 30 tablet, Rfl: 0    cyanocobalamin 1,000 mcg TbSR, Take 1,000 mcg by mouth once daily., Disp: 30 tablet, Rfl: 0    ferrous sulfate 324 mg (65 mg iron) TbEC, Take 1 tablet (324 mg total) by mouth once daily., Disp: 90 tablet, Rfl: 1    folic acid (FOLVITE) 1 MG tablet, Take 1 tablet (1 mg total) by mouth once daily., Disp: 30 tablet, Rfl: 2    warfarin (COUMADIN) 5 MG tablet, Take 1 tablet (5 mg total) by mouth Daily. At hour of sleep, Disp: 30 tablet, Rfl: 4           Review of Systems   HENT:  Positive for postnasal drip and rhinorrhea.                 Vitals:    02/05/24 1035   BP: 126/77   BP Location: Left arm   Patient Position: Sitting   BP Method: Large (Automatic)   Pulse: 75   Resp: 17   Temp: 97.3 °F (36.3 °C)   TempSrc: Temporal   SpO2: 100%   Weight: 82.1 kg (181 lb)   Height: 5' 4" (1.626 m)        Physical Exam  Vitals and nursing note reviewed.   Constitutional:       Appearance: Normal appearance.   Cardiovascular:      Rate and Rhythm: Normal rate and regular rhythm.      Pulses: Normal pulses.      Heart sounds: " Normal heart sounds.   Pulmonary:      Effort: Pulmonary effort is normal.      Breath sounds: Normal breath sounds.   Abdominal:      General: Abdomen is flat. Bowel sounds are normal.      Palpations: Abdomen is soft.   Musculoskeletal:         General: Normal range of motion.   Skin:     General: Skin is warm and dry.   Neurological:      General: No focal deficit present.      Mental Status: She is alert and oriented to person, place, and time. Mental status is at baseline.           Last Labs:     Lab Visit on 01/24/2024   Component Date Value    PT 01/24/2024 22.5 (H)     INR 01/24/2024 2.02        Last Imaging:  Mammo Digital Screening Bilat w/ Natalio  Narrative: Result:   Mammo Digital Screening Bilat w/ Natalio     History:  Patient is 53 y.o. and is seen for a screening mammogram.    Films Compared:  Compared to: 02/23/2022 Mammo Digital Screening Bilat     Findings:  This procedure was performed using tomosynthesis. Computer-aided detection   was utilized in the interpretation of this examination.  The breasts have scattered areas of fibroglandular density. There is no   evidence of suspicious masses, calcifications, or other abnormal findings.  Impression: Bilateral  There is no mammographic evidence of malignancy.    BI-RADS Category:   Overall: 1 - Negative       Recommendation:  Routine screening mammogram in 1 year is recommended.    Your estimated lifetime risk of breast cancer (to age 85) based on   Tyrer-Cuzick risk assessment model is Tyrer-Cuzick: 4.51 %. According to   the American Cancer Society, patients with a lifetime breast cancer risk   of 20% or higher might benefit from supplemental screening tests.         **Labs and x-rays personally reviewed by me    ** reviewed      Objective:        Assessment:       1. Anemia, unspecified type  Ferritin    Iron and TIBC    Protein Electrophoresis, Serum with Reflex SUSHMA    Ambulatory referral/consult to Hematology / Oncology    CANCELED: Ferritin     CANCELED: Iron and TIBC    CANCELED: Protein Electrophoresis, Serum with Reflex SUSHMA      2. Vitamin D deficiency  ferrous sulfate 324 mg (65 mg iron) TbEC      3. Chronic deep vein thrombosis (DVT) of other vein of both lower extremities  folic acid (FOLVITE) 1 MG tablet      4. Chronic deep vein thrombosis (DVT) of left lower extremity, unspecified vein  warfarin (COUMADIN) 5 MG tablet      5. Allergic rhinitis due to pollen, unspecified seasonality             Plan:         1. Anemia, unspecified type  -     Ferritin; Future; Expected date: 02/05/2024  -     Iron and TIBC; Future; Expected date: 02/05/2024  -     Protein Electrophoresis, Serum with Reflex SUSHMA; Future; Expected date: 02/05/2024  -     Ambulatory referral/consult to Hematology / Oncology; Future; Expected date: 02/12/2024    2. Vitamin D deficiency  -     ferrous sulfate 324 mg (65 mg iron) TbEC; Take 1 tablet (324 mg total) by mouth once daily.  Dispense: 90 tablet; Refill: 1    3. Chronic deep vein thrombosis (DVT) of other vein of both lower extremities  -     folic acid (FOLVITE) 1 MG tablet; Take 1 tablet (1 mg total) by mouth once daily.  Dispense: 30 tablet; Refill: 2    4. Chronic deep vein thrombosis (DVT) of left lower extremity, unspecified vein  -     warfarin (COUMADIN) 5 MG tablet; Take 1 tablet (5 mg total) by mouth Daily. At hour of sleep  Dispense: 30 tablet; Refill: 4    5. Allergic rhinitis due to pollen, unspecified seasonality    Other orders  -     cyanocobalamin (VITAMIN B-12) 100 MCG tablet; Take 1 tablet (100 mcg total) by mouth once daily.  Dispense: 30 tablet; Refill: 0

## 2024-07-24 DIAGNOSIS — I82.593 CHRONIC DEEP VEIN THROMBOSIS (DVT) OF OTHER VEIN OF BOTH LOWER EXTREMITIES: ICD-10-CM

## 2024-07-24 RX ORDER — FOLIC ACID 1 MG/1
1000 TABLET ORAL
Qty: 30 TABLET | Refills: 0 | Status: SHIPPED | OUTPATIENT
Start: 2024-07-24

## 2024-12-04 ENCOUNTER — OFFICE VISIT (OUTPATIENT)
Dept: FAMILY MEDICINE | Facility: CLINIC | Age: 54
End: 2024-12-04
Payer: COMMERCIAL

## 2024-12-04 VITALS
DIASTOLIC BLOOD PRESSURE: 74 MMHG | HEART RATE: 102 BPM | OXYGEN SATURATION: 97 % | RESPIRATION RATE: 17 BRPM | WEIGHT: 178 LBS | HEIGHT: 64 IN | BODY MASS INDEX: 30.39 KG/M2 | SYSTOLIC BLOOD PRESSURE: 124 MMHG | TEMPERATURE: 98 F

## 2024-12-04 DIAGNOSIS — R60.0 LEG EDEMA, LEFT: ICD-10-CM

## 2024-12-04 DIAGNOSIS — I82.593 CHRONIC DEEP VEIN THROMBOSIS (DVT) OF OTHER VEIN OF BOTH LOWER EXTREMITIES: ICD-10-CM

## 2024-12-04 DIAGNOSIS — E53.8 VITAMIN B12 DEFICIENCY: Primary | ICD-10-CM

## 2024-12-04 DIAGNOSIS — I82.502 CHRONIC DEEP VEIN THROMBOSIS (DVT) OF LEFT LOWER EXTREMITY, UNSPECIFIED VEIN: ICD-10-CM

## 2024-12-04 DIAGNOSIS — E55.9 VITAMIN D DEFICIENCY: ICD-10-CM

## 2024-12-04 LAB
ANISOCYTOSIS BLD QL SMEAR: NORMAL
APTT PPP: 30.6 SECONDS (ref 25.2–37.3)
BASOPHILS # BLD AUTO: 0.05 K/UL (ref 0–0.2)
BASOPHILS NFR BLD AUTO: 1 % (ref 0–1)
DIFFERENTIAL METHOD BLD: ABNORMAL
EOSINOPHIL # BLD AUTO: 0.04 K/UL (ref 0–0.5)
EOSINOPHIL NFR BLD AUTO: 0.8 % (ref 1–4)
ERYTHROCYTE [DISTWIDTH] IN BLOOD BY AUTOMATED COUNT: 17.2 % (ref 11.5–14.5)
HCT VFR BLD AUTO: 32.8 % (ref 38–47)
HGB BLD-MCNC: 9.3 G/DL (ref 12–16)
HYPOCHROMIA BLD QL SMEAR: NORMAL
IMM GRANULOCYTES # BLD AUTO: 0.01 K/UL (ref 0–0.04)
IMM GRANULOCYTES NFR BLD: 0.2 % (ref 0–0.4)
INR BLD: 1.13
LYMPHOCYTES # BLD AUTO: 1.96 K/UL (ref 1–4.8)
LYMPHOCYTES NFR BLD AUTO: 38.2 % (ref 27–41)
MCH RBC QN AUTO: 17.8 PG (ref 27–31)
MCHC RBC AUTO-ENTMCNC: 28.4 G/DL (ref 32–36)
MCV RBC AUTO: 62.8 FL (ref 80–96)
MICROCYTES BLD QL SMEAR: NORMAL
MONOCYTES # BLD AUTO: 0.64 K/UL (ref 0–0.8)
MONOCYTES NFR BLD AUTO: 12.5 % (ref 2–6)
MPC BLD CALC-MCNC: ABNORMAL G/DL
NEUTROPHILS # BLD AUTO: 2.43 K/UL (ref 1.8–7.7)
NEUTROPHILS NFR BLD AUTO: 47.3 % (ref 53–65)
NRBC # BLD AUTO: 0 X10E3/UL
NRBC, AUTO (.00): 0 %
OVALOCYTES BLD QL SMEAR: NORMAL
PLATELET # BLD AUTO: 180 K/UL (ref 150–400)
PLATELET MORPHOLOGY: NORMAL
PROTHROMBIN TIME: 14.4 SECONDS (ref 11.7–14.7)
RBC # BLD AUTO: 5.22 M/UL (ref 4.2–5.4)
TARGETS BLD QL SMEAR: NORMAL
VIT B12 SERPL-MCNC: 1384 PG/ML (ref 213–816)
WBC # BLD AUTO: 5.13 K/UL (ref 4.5–11)

## 2024-12-04 PROCEDURE — 85025 COMPLETE CBC W/AUTO DIFF WBC: CPT | Mod: ,,, | Performed by: CLINICAL MEDICAL LABORATORY

## 2024-12-04 PROCEDURE — 99214 OFFICE O/P EST MOD 30 MIN: CPT | Mod: ,,, | Performed by: INTERNAL MEDICINE

## 2024-12-04 PROCEDURE — 82607 VITAMIN B-12: CPT | Mod: ,,, | Performed by: CLINICAL MEDICAL LABORATORY

## 2024-12-04 RX ORDER — VIT C/E/ZN/COPPR/LUTEIN/ZEAXAN 250MG-90MG
1000 CAPSULE ORAL DAILY
Qty: 90 EACH | Refills: 1 | Status: SHIPPED | OUTPATIENT
Start: 2024-12-04

## 2024-12-04 RX ORDER — WARFARIN SODIUM 5 MG/1
5 TABLET ORAL DAILY
Qty: 90 TABLET | Refills: 1 | Status: SHIPPED | OUTPATIENT
Start: 2024-12-04

## 2024-12-04 RX ORDER — FERROUS SULFATE 324(65)MG
324 TABLET, DELAYED RELEASE (ENTERIC COATED) ORAL DAILY
Qty: 90 TABLET | Refills: 1 | Status: SHIPPED | OUTPATIENT
Start: 2024-12-04

## 2024-12-04 RX ORDER — FOLIC ACID 1 MG/1
1000 TABLET ORAL DAILY
Qty: 90 TABLET | Refills: 1 | Status: SHIPPED | OUTPATIENT
Start: 2024-12-04

## 2024-12-04 RX ORDER — PNV NO.95/FERROUS FUM/FOLIC AC 28MG-0.8MG
100 TABLET ORAL DAILY
Qty: 90 TABLET | Refills: 1 | Status: SHIPPED | OUTPATIENT
Start: 2024-12-04

## 2024-12-04 NOTE — PROGRESS NOTES
"Subjective:       Patient ID: Karli Singh is a 54 y.o. female.    Chief Complaint: Medication Refill    History of Present Illness    CHIEF COMPLAINT:  Patient presents today for medication refills.    MEDICAL HISTORY:  She has a history of vitamin B12 deficiency, anemia, and protein S deficiency. She is currently taking warfarin for the protein S deficiency, which predisposes her to blood clots.    CURRENT MEDICATIONS:  She is currently taking warfarin (Coumadin) for protein S deficiency and B12 supplements for vitamin B12 deficiency anemia.    LEFT LEG SWELLING:  She reports left leg swelling, noting it is more swollen than the right leg. She attributes this to working the previous night.    HEALTH MAINTENANCE:  She is overdue for colonoscopy, which was scheduled three months ago but missed. Mammogram is potentially due, as it has been approximately one year since the last screening.         Current Medications:    Current Outpatient Medications:     cyanocobalamin (VITAMIN B-12) 100 MCG tablet, Take 1 tablet (100 mcg total) by mouth once daily., Disp: 90 tablet, Rfl: 1    cyanocobalamin, vitamin B-12, 1,000 mcg TbSR, Take 1,000 mcg by mouth once daily., Disp: 90 each, Rfl: 1    ferrous sulfate 324 mg (65 mg iron) TbEC, Take 1 tablet (324 mg total) by mouth once daily., Disp: 90 tablet, Rfl: 1    folic acid (FOLVITE) 1 MG tablet, Take 1 tablet (1,000 mcg total) by mouth once daily., Disp: 90 tablet, Rfl: 1    warfarin (COUMADIN) 5 MG tablet, Take 1 tablet (5 mg total) by mouth Daily. At hour of sleep, Disp: 90 tablet, Rfl: 1           ROS  Twelve point system reviewed, unremarkable except for stated above in HPI.        Objective:         Vitals:    12/04/24 1332   BP: 124/74   BP Location: Left arm   Patient Position: Sitting   Pulse: 102   Resp: 17   Temp: 97.9 °F (36.6 °C)   TempSrc: Temporal   SpO2: 97%   Weight: 80.7 kg (178 lb)   Height: 5' 4" (1.626 m)        Physical Exam     Patient is awake alert " oriented person place and  Lungs are clear to auscultation bilaterally no crackles or wheezes   Cardiovascular S1-S2 regular rate and rhythm no murmurs rubs or gallops   Abdomen is soft positive bowel sounds nontender, extremities no clubbing cyanosis edema  Neuro no focal neurological deficits  Skin warm and dry.     Last Labs:     No visits with results within 1 Month(s) from this visit.   Latest known visit with results is:   Lab Visit on 01/24/2024   Component Date Value    PT 01/24/2024 22.5 (H)     INR 01/24/2024 2.02        Last Imaging:  Mammo Digital Screening Bilat w/ Natalio  Narrative: Result:   Mammo Digital Screening Bilat w/ Natalio     History:  Patient is 53 y.o. and is seen for a screening mammogram.    Films Compared:  Compared to: 02/23/2022 Mammo Digital Screening Bilat     Findings:  This procedure was performed using tomosynthesis. Computer-aided detection   was utilized in the interpretation of this examination.  The breasts have scattered areas of fibroglandular density. There is no   evidence of suspicious masses, calcifications, or other abnormal findings.  Impression: Bilateral  There is no mammographic evidence of malignancy.    BI-RADS Category:   Overall: 1 - Negative       Recommendation:  Routine screening mammogram in 1 year is recommended.    Your estimated lifetime risk of breast cancer (to age 85) based on   Tyrer-Cuzick risk assessment model is Tyrer-Cuzick: 4.51 %. According to   the [employer], patients with a lifetime breast cancer risk   of 20% or higher might benefit from supplemental screening tests.         **Labs and x-rays personally reviewed by me    ** reviewed           Assessment & Plan:   Assessment & Plan    IMPRESSION:  - Assessed patient's need for medication refills  - Evaluated protein S deficiency and risk of blood clots  - Considered potential deep vein thrombosis due to leg swelling, especially given patient's protein S deficiency and possible  subtherapeutic Coumadin level  - Determined need for venous Doppler study to rule out blood clot in left leg    PROTEIN S DEFICIENCY:  - Acknowledged the patient's protein S deficiency and its predisposition to blood clots.  - Acknowledged the patient's history of protein S deficiency and its predisposition to blood clots.    ANTICOAGULATION MANAGEMENT:  - Ordered PT and INR to check Coumadin level for managing the condition.  - Refilled warfarin (Coumadin).  - Ordered PT and INR to check Coumadin level for managing anticoagulation.    VITAMIN B12 DEFICIENCY:  - Refilled vitamin B12 supplement.  - Ordered vitamin B12 level to assess current status.    ANEMIA:  - Ordered CBC to assess anemia status.    SUSPECTED DEEP VEIN THROMBOSIS:  - Examined the patient's left leg and confirmed increased swelling.  - Expressed concern about potential blood clot due to swelling and underlying condition.  - Ordered venous Doppler study of the left leg for further evaluation.  - Examined the patient's left leg and confirmed increased swelling.  - Ordered venous Doppler study due to edema in the left leg.    FOLLOW UP:  - None Note: The following items from the PLAN section are not directly related to the given ICD-10 codes and are general follow-up instructions: - Mammogram ordered. - Rescheduled colonoscopy. - Follow up in March as scheduled.           1. Vitamin B12 deficiency  -     CBC Auto Differential; Future; Expected date: 12/04/2024  -     Vitamin B12; Future; Expected date: 12/04/2024    2. Vitamin D deficiency  -     ferrous sulfate 324 mg (65 mg iron) TbEC; Take 1 tablet (324 mg total) by mouth once daily.  Dispense: 90 tablet; Refill: 1    3. Chronic deep vein thrombosis (DVT) of other vein of both lower extremities  -     folic acid (FOLVITE) 1 MG tablet; Take 1 tablet (1,000 mcg total) by mouth once daily.  Dispense: 90 tablet; Refill: 1    4. Chronic deep vein thrombosis (DVT) of left lower extremity, unspecified  vein  -     warfarin (COUMADIN) 5 MG tablet; Take 1 tablet (5 mg total) by mouth Daily. At hour of sleep  Dispense: 90 tablet; Refill: 1    5. Leg edema, left  -     PT and PTT; Future; Expected date: 12/04/2024  -      Lower Extremity Veins Left; Future; Expected date: 12/04/2024    Other orders  -     cyanocobalamin (VITAMIN B-12) 100 MCG tablet; Take 1 tablet (100 mcg total) by mouth once daily.  Dispense: 90 tablet; Refill: 1  -     cyanocobalamin, vitamin B-12, 1,000 mcg TbSR; Take 1,000 mcg by mouth once daily.  Dispense: 90 each; Refill: 1            Derek Bar MD  This note was generated with the assistance of ambient listening technology. Verbal consent was obtained by the patient and accompanying visitor(s) for the recording of patient appointment to facilitate this note. I attest to having reviewed and edited the generated note for accuracy, though some syntax or spelling errors may persist. Please contact the author of this note for any clarification.   Was not agree with the do meds on a I will get the med we added will

## 2024-12-09 ENCOUNTER — PATIENT MESSAGE (OUTPATIENT)
Dept: FAMILY MEDICINE | Facility: CLINIC | Age: 54
End: 2024-12-09
Payer: COMMERCIAL

## 2024-12-16 ENCOUNTER — HOSPITAL ENCOUNTER (OUTPATIENT)
Dept: RADIOLOGY | Facility: HOSPITAL | Age: 54
Discharge: HOME | End: 2024-12-16
Attending: INTERNAL MEDICINE
Payer: COMMERCIAL

## 2024-12-16 DIAGNOSIS — R60.0 LEG EDEMA, LEFT: ICD-10-CM

## 2024-12-16 PROCEDURE — 93971 EXTREMITY STUDY: CPT | Mod: 26,LT,, | Performed by: RADIOLOGY

## 2024-12-16 PROCEDURE — 93971 EXTREMITY STUDY: CPT | Mod: TC,LT

## 2025-01-21 DIAGNOSIS — I82.593 CHRONIC DEEP VEIN THROMBOSIS (DVT) OF OTHER VEIN OF BOTH LOWER EXTREMITIES: ICD-10-CM

## 2025-01-24 RX ORDER — FOLIC ACID 1 MG/1
1000 TABLET ORAL
Qty: 30 TABLET | Refills: 0 | Status: SHIPPED | OUTPATIENT
Start: 2025-01-24

## 2025-07-11 DIAGNOSIS — I82.593 CHRONIC DEEP VEIN THROMBOSIS (DVT) OF OTHER VEIN OF BOTH LOWER EXTREMITIES: ICD-10-CM

## 2025-07-14 RX ORDER — FOLIC ACID 1 MG/1
1000 TABLET ORAL
Qty: 30 TABLET | Refills: 0 | Status: SHIPPED | OUTPATIENT
Start: 2025-07-14

## 2025-09-03 DIAGNOSIS — I82.502 CHRONIC DEEP VEIN THROMBOSIS (DVT) OF LEFT LOWER EXTREMITY, UNSPECIFIED VEIN: ICD-10-CM

## 2025-09-03 RX ORDER — WARFARIN SODIUM 5 MG/1
TABLET ORAL
Qty: 90 TABLET | Refills: 0 | Status: SHIPPED | OUTPATIENT
Start: 2025-09-03